# Patient Record
Sex: FEMALE | Race: WHITE | NOT HISPANIC OR LATINO
[De-identification: names, ages, dates, MRNs, and addresses within clinical notes are randomized per-mention and may not be internally consistent; named-entity substitution may affect disease eponyms.]

---

## 2023-05-17 PROBLEM — Z00.00 ENCOUNTER FOR PREVENTIVE HEALTH EXAMINATION: Status: ACTIVE | Noted: 2023-05-17

## 2023-06-06 ENCOUNTER — NON-APPOINTMENT (OUTPATIENT)
Age: 63
End: 2023-06-06

## 2023-06-07 ENCOUNTER — APPOINTMENT (OUTPATIENT)
Dept: CT IMAGING | Facility: IMAGING CENTER | Age: 63
End: 2023-06-07

## 2023-06-07 ENCOUNTER — APPOINTMENT (OUTPATIENT)
Dept: TRANSPLANT | Facility: CLINIC | Age: 63
End: 2023-06-07

## 2023-06-07 ENCOUNTER — APPOINTMENT (OUTPATIENT)
Dept: RADIOLOGY | Facility: IMAGING CENTER | Age: 63
End: 2023-06-07

## 2023-06-07 ENCOUNTER — APPOINTMENT (OUTPATIENT)
Dept: NEPHROLOGY | Facility: CLINIC | Age: 63
End: 2023-06-07
Payer: SUBSIDIZED

## 2023-06-07 VITALS
SYSTOLIC BLOOD PRESSURE: 160 MMHG | WEIGHT: 176 LBS | RESPIRATION RATE: 14 BRPM | BODY MASS INDEX: 33.23 KG/M2 | HEIGHT: 61 IN | OXYGEN SATURATION: 98 % | HEART RATE: 74 BPM | DIASTOLIC BLOOD PRESSURE: 90 MMHG

## 2023-06-07 VITALS — DIASTOLIC BLOOD PRESSURE: 82 MMHG | SYSTOLIC BLOOD PRESSURE: 140 MMHG

## 2023-06-07 VITALS — SYSTOLIC BLOOD PRESSURE: 155 MMHG | DIASTOLIC BLOOD PRESSURE: 90 MMHG

## 2023-06-07 VITALS — SYSTOLIC BLOOD PRESSURE: 143 MMHG | DIASTOLIC BLOOD PRESSURE: 92 MMHG

## 2023-06-07 PROCEDURE — 99205 OFFICE O/P NEW HI 60 MIN: CPT

## 2023-06-07 NOTE — HISTORY OF PRESENT ILLNESS
[FreeTextEntry1] : 62  years old female, born in [facility] , living in  since in . Want to be evaluated for kidney donor for her -Dr. Roni Jackson, who is a  in [place] and a preemptive kidney transplant candidate with diabetic kidney disease.\par \par She reports she was discouraged as donor from [facility] due to asymmetric kidneys\par \par Patient has no known kidney disease/DM . She has  HTN (age 59) on Olmesartan. H/o Hyperlipidemia on statin. No h/o Gout\par No known h/o kidney stone\par No hematuria/Transfusions\par Nocturia: twice.\par No h/o Sleep apnea. Had h/o Thyroid inflammation. Was on Levothyroxine a year ago, but not anymore.\par Does not take any Coumadin/eliquis/Plavix/Brilinta. No known h/o tuberculosis or hepatitis.\par She had hepatitis A.\par No h/o NSAID use.\par No major weight loss/weight loss surgeries\par Has no h/o Pneumonia / UTI.\par No known h/o active CAD/CVA/PVD/DVT/neoplasia/active infections/bleeding/open wounds/falls/seizures/psych issues\par Had mild COVID.\par COVID vaccination:Yes\par \par Most recent hospitalization/for:\par Past surgeries: \par None\par No history of kidney/ bladder surgery.\par Non smoker.\par Family: \par Lives with: ; Son lives in [place]\par Care giver post op:\par Parents are . Father - Kidney stones  Mother- HTN , Myeloma kidney failure was on dialysis Siblings- only child\par Children: One son,33 years old,  he is a physician at [employer] as Rheumatologist- Silas Cooper. Two grand children.\par No family history of kidney disease\par Independent for ADL\par Able to walk two blocks, can climb stairs with difficulty.\par \par ROS: Has no h/o shortness of breath on exertion. 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

## 2023-06-07 NOTE — ASSESSMENT
[FreeTextEntry1] : Potential kidney donor: Clinically borderline as a potential donor. Has hypertensin, obesity, hyperlipidemia on treatment, h/o being excluded as potential donor for asymmetric  renal imaging. Discussed process of evaluation of kidney donor including medical, surgical and psychosocial evaluation\par Discussed methods of assessing risks for CKD, perioperative risk and long term risks following kidney donation.\par Patient already had discussion regarding donation surgery and perioperative risks with surgeon, and again discussed these and answered questions.\par Discussed decline in GFR with age and effect of kidney donation on GFR\par Discussed Renal function Preservation Strategies.\par Discussed available data on follow up of kidney donors in literature and and data on potential risks of kidney donation.\par Discussed role of donor team members in the process of donor evaluation.\par discussed regarding progression of decline in GFR with chronic kidney diseases with one vs two kidneys.\par Discussed and and answered questions on the alternatives available to the potential kidney recipient including long term dialysis,  donor transplantation and potential advantages to the recipients of living donor transplantation.\par She reports  is very reluctant to accept her kidney. However she wishes to proceed with evaluation. Will need imaging studies to be reviewed with team prior to ascertaining candidacy as kidney donor. She is aware of this.

## 2023-06-07 NOTE — PHYSICAL EXAM
[General Appearance - Alert] : alert [General Appearance - In No Acute Distress] : in no acute distress [Sclera] : the sclera and conjunctiva were normal [PERRL With Normal Accommodation] : pupils were equal in size, round, and reactive to light [Extraocular Movements] : extraocular movements were intact [Outer Ear] : the ears and nose were normal in appearance [Oropharynx] : the oropharynx was normal [Jugular Venous Distention Increased] : there was no jugular-venous distention [Auscultation Breath Sounds / Voice Sounds] : lungs were clear to auscultation bilaterally [Heart Sounds Gallop] : no gallops [Heart Sounds Pericardial Friction Rub] : no pericardial rub [Full Pulse] : the pedal pulses are present [Edema] : there was no peripheral edema [Abdomen Soft] : soft [Abdomen Tenderness] : non-tender [Cervical Lymph Nodes Enlarged Posterior Bilaterally] : posterior cervical [Supraclavicular Lymph Nodes Enlarged Bilaterally] : supraclavicular [Cervical Lymph Nodes Enlarged Anterior Bilaterally] : anterior cervical [Involuntary Movements] : no involuntary movements were seen [] : no rash [No Focal Deficits] : no focal deficits [Oriented To Time, Place, And Person] : oriented to person, place, and time [Impaired Insight] : insight and judgment were intact [Affect] : the affect was normal

## 2023-06-23 ENCOUNTER — OUTPATIENT (OUTPATIENT)
Dept: OUTPATIENT SERVICES | Facility: HOSPITAL | Age: 63
LOS: 1 days | End: 2023-06-23

## 2023-06-23 DIAGNOSIS — Z00.8 ENCOUNTER FOR OTHER GENERAL EXAMINATION: ICD-10-CM

## 2023-07-05 ENCOUNTER — APPOINTMENT (OUTPATIENT)
Dept: CARDIOLOGY | Facility: CLINIC | Age: 63
End: 2023-07-05
Payer: SUBSIDIZED

## 2023-07-05 PROCEDURE — 93351 STRESS TTE COMPLETE: CPT

## 2023-07-12 ENCOUNTER — APPOINTMENT (OUTPATIENT)
Dept: CV DIAGNOSITCS | Facility: HOSPITAL | Age: 63
End: 2023-07-12
Payer: SUBSIDIZED

## 2023-07-12 ENCOUNTER — OUTPATIENT (OUTPATIENT)
Dept: OUTPATIENT SERVICES | Facility: HOSPITAL | Age: 63
LOS: 1 days | End: 2023-07-12

## 2023-07-12 DIAGNOSIS — I10 ESSENTIAL (PRIMARY) HYPERTENSION: ICD-10-CM

## 2023-07-12 DIAGNOSIS — Z00.5 ENCOUNTER FOR EXAMINATION OF POTENTIAL DONOR OF ORGAN AND TISSUE: ICD-10-CM

## 2023-07-12 PROCEDURE — 93306 TTE W/DOPPLER COMPLETE: CPT | Mod: 26

## 2023-07-13 ENCOUNTER — NON-APPOINTMENT (OUTPATIENT)
Age: 63
End: 2023-07-13

## 2023-07-17 ENCOUNTER — TRANSCRIPTION ENCOUNTER (OUTPATIENT)
Age: 63
End: 2023-07-17

## 2023-07-28 ENCOUNTER — APPOINTMENT (OUTPATIENT)
Dept: TRANSPLANT | Facility: CLINIC | Age: 63
End: 2023-07-28
Payer: SUBSIDIZED

## 2023-07-28 ENCOUNTER — OUTPATIENT (OUTPATIENT)
Dept: OUTPATIENT SERVICES | Facility: HOSPITAL | Age: 63
LOS: 1 days | End: 2023-07-28
Payer: COMMERCIAL

## 2023-07-28 VITALS
HEART RATE: 67 BPM | DIASTOLIC BLOOD PRESSURE: 94 MMHG | BODY MASS INDEX: 32.66 KG/M2 | SYSTOLIC BLOOD PRESSURE: 154 MMHG | WEIGHT: 173 LBS | HEIGHT: 61 IN

## 2023-07-28 VITALS
TEMPERATURE: 98 F | WEIGHT: 172.62 LBS | OXYGEN SATURATION: 97 % | HEART RATE: 65 BPM | SYSTOLIC BLOOD PRESSURE: 136 MMHG | HEIGHT: 62 IN | DIASTOLIC BLOOD PRESSURE: 86 MMHG | RESPIRATION RATE: 16 BRPM

## 2023-07-28 VITALS
HEIGHT: 61 IN | RESPIRATION RATE: 14 BRPM | DIASTOLIC BLOOD PRESSURE: 107 MMHG | TEMPERATURE: 98 F | SYSTOLIC BLOOD PRESSURE: 179 MMHG | HEART RATE: 72 BPM | BODY MASS INDEX: 33.23 KG/M2 | OXYGEN SATURATION: 98 % | WEIGHT: 176 LBS

## 2023-07-28 VITALS — DIASTOLIC BLOOD PRESSURE: 90 MMHG | SYSTOLIC BLOOD PRESSURE: 128 MMHG

## 2023-07-28 DIAGNOSIS — Z29.9 ENCOUNTER FOR PROPHYLACTIC MEASURES, UNSPECIFIED: ICD-10-CM

## 2023-07-28 DIAGNOSIS — Z98.890 OTHER SPECIFIED POSTPROCEDURAL STATES: Chronic | ICD-10-CM

## 2023-07-28 DIAGNOSIS — Z01.818 ENCOUNTER FOR OTHER PREPROCEDURAL EXAMINATION: ICD-10-CM

## 2023-07-28 DIAGNOSIS — Z52.4 KIDNEY DONOR: ICD-10-CM

## 2023-07-28 DIAGNOSIS — Z90.89 ACQUIRED ABSENCE OF OTHER ORGANS: Chronic | ICD-10-CM

## 2023-07-28 LAB
BLD GP AB SCN SERPL QL: NEGATIVE — SIGNIFICANT CHANGE UP
RH IG SCN BLD-IMP: POSITIVE — SIGNIFICANT CHANGE UP

## 2023-07-28 PROCEDURE — 99205 OFFICE O/P NEW HI 60 MIN: CPT

## 2023-07-28 PROCEDURE — 86900 BLOOD TYPING SEROLOGIC ABO: CPT

## 2023-07-28 PROCEDURE — G0463: CPT

## 2023-07-28 PROCEDURE — 86850 RBC ANTIBODY SCREEN: CPT

## 2023-07-28 PROCEDURE — 86901 BLOOD TYPING SEROLOGIC RH(D): CPT

## 2023-07-28 RX ORDER — CHLORHEXIDINE GLUCONATE 213 G/1000ML
1 SOLUTION TOPICAL ONCE
Refills: 0 | Status: DISCONTINUED | OUTPATIENT
Start: 2023-08-07 | End: 2023-08-09

## 2023-07-28 NOTE — H&P PST ADULT - OPHTHALMOLOGIC
Spoke with mom. Hotelementst access obtained. Video visit with provider  Completed.    Electronically Signed by:    Delilah Mcfarlane CMA  07/08/20         details…

## 2023-07-28 NOTE — H&P PST ADULT - ATTENDING COMMENTS
VERONICA MCKENNA was seen and evaluated today in the preoperative holding area.  As documented, VERONICA MCKENNA is a 63yFemale here as a kidney donor. She has had no major illnesses or hospitalizations since the last office visit.    We discussed the risks and benefits of laparoscopic, possible open, donor nephrectomy.  Surgical risks include but are not limited to bleeding, infection, urine leak, rhabdomyolysis, need to convert to an open procedure and potential need for re operation postoperatively.  We also discussed the small risk of dying as an immediate consequency of the operation.  VERONICA MCKENNA understands these risks and is willing to proceed with donor nephrectomy.

## 2023-07-28 NOTE — PHYSICAL EXAM
[Well Developed] : well developed [Clear to Auscultation] : clear to auscultation [Breathing Comfortably on RA] : breathing comfortably on room air [Normal Rate] : normal rate [Regular Rhythm] : regular rhythm [Soft] : soft [Non-tender] : non-tender [Alert] : alert [Oriented] : oriented [Appropriate] : appropriate

## 2023-07-28 NOTE — H&P PST ADULT - NSICDXPASTSURGICALHX_GEN_ALL_CORE_FT
PAST SURGICAL HISTORY:  H/O colonoscopy      PAST SURGICAL HISTORY:  H/O colonoscopy      (normal spontaneous vaginal delivery)     S/P tonsillectomy

## 2023-07-28 NOTE — H&P PST ADULT - NSICDXFAMILYHX_GEN_ALL_CORE_FT
FAMILY HISTORY:  Father  Still living? Unknown  FH: hypertension, Age at diagnosis: Age Unknown    Mother  Still living? No  FH: hypertension, Age at diagnosis: Age Unknown  FH: kidney failure, Age at diagnosis: Age Unknown  FH: multiple myeloma, Age at diagnosis: Age Unknown

## 2023-07-28 NOTE — REVIEW OF SYSTEMS
[Fever] : no fever [Night Sweats] : no night sweats [Chest Pain] : no chest pain [Orthopnea] : no orthopnea [Shortness Of Breath] : no shortness of breath [Abdominal Pain] : no abdominal pain [Nausea] : no nausea [Vomiting] : no vomiting [Heartburn] : no heartburn [Incontinence] : no incontinence [Joint Pain] : no joint pain [Muscle Pain] : no muscle pain [Headache] : no headache [Memory Loss] : no memory loss

## 2023-07-28 NOTE — H&P PST ADULT - NSICDXPASTMEDICALHX_GEN_ALL_CORE_FT
PAST MEDICAL HISTORY:  2019 novel coronavirus disease (COVID-19)     Hyperlipidemia     Hypertension      PAST MEDICAL HISTORY:  2019 novel coronavirus disease (COVID-19)     History of IBS     Hyperlipidemia     Hypertension

## 2023-07-28 NOTE — H&P PST ADULT - HISTORY OF PRESENT ILLNESS
64 y/o female presents today for kidney donation.   64 y/o female presents today for presurgical testing as she is donating her kidney to her .  PMHx includes HTN (controlled with monotherapy), HLD and IBS.  She is scheduled for laparoscopic right nephrectomy for living donation, possible open, possible left on 8/7/23.      She denies any recent infections, fever, chills, chest pain, shortness of breath, cough, wheezing, sore throat and change in taste/smell.

## 2023-07-28 NOTE — H&P PST ADULT - ASSESSMENT
DASI score: 6.70  DASI activity: walks, 2 flights, housework, ADL's  Loose teeth or denture: denies  Mallampati II    CAPRINI VTE 2.0 SCORE [CLOT updated 2019]    AGE RELATED RISK FACTORS                                                       MOBILITY RELATED FACTORS  [ ] Age 41-60 years                                            (1 Point)                    [ ] Bed rest                                                        (1 Point)  [x ] Age: 61-74 years                                           (2 Points)                  [ ] Plaster cast                                                   (2 Points)  [ ] Age= 75 years                                              (3 Points)                    [ ] Bed bound for more than 72 hours                 (2 Points)    DISEASE RELATED RISK FACTORS                                               GENDER SPECIFIC FACTORS  [ ] Edema in the lower extremities                       (1 Point)              [ ] Pregnancy                                                     (1 Point)  [ ] Varicose veins                                               (1 Point)                     [ ] Post-partum < 6 weeks                                   (1 Point)             [ ] BMI > 25 Kg/m2                                            (1 Point)                     [ ] Hormonal therapy  or oral contraception          (1 Point)                 [ ] Sepsis (in the previous month)                        (1 Point)               [ ] History of pregnancy complications                 (1 point)  [ ] Pneumonia or serious lung disease                                               [ ] Unexplained or recurrent                     (1 Point)           (in the previous month)                               (1 Point)  [ ] Abnormal pulmonary function test                     (1 Point)                 SURGERY RELATED RISK FACTORS  [ ] Acute myocardial infarction                              (1 Point)               [ ]  Section                                             (1 Point)  [ ] Congestive heart failure (in the previous month)  (1 Point)      [ ] Minor surgery                                                  (1 Point)   [ ] Inflammatory bowel disease                             (1 Point)               [ ] Arthroscopic surgery                                        (2 Points)  [ ] Central venous access                                      (2 Points)                [x ] General surgery lasting more than 45 minutes (2 points)  [ ] Malignancy- Present or previous                   (2 Points)                [ ] Elective arthroplasty                                         (5 points)    [ ] Stroke (in the previous month)                          (5 Points)                                                                                                                                                           HEMATOLOGY RELATED FACTORS                                                 TRAUMA RELATED RISK FACTORS  [ ] Prior episodes of VTE                                     (3 Points)                [ ] Fracture of the hip, pelvis, or leg                       (5 Points)  [ ] Positive family history for VTE                         (3 Points)             [ ] Acute spinal cord injury (in the previous month)  (5 Points)  [ ] Prothrombin 96664 A                                     (3 Points)               [ ] Paralysis  (less than 1 month)                             (5 Points)  [ ] Factor V Leiden                                             (3 Points)                  [ ] Multiple Trauma within 1 month                        (5 Points)  [ ] Lupus anticoagulants                                     (3 Points)                                                           [ ] Anticardiolipin antibodies                               (3 Points)                                                       [ ] High homocysteine in the blood                      (3 Points)                                             [ ] Other congenital or acquired thrombophilia      (3 Points)                                                [ ] Heparin induced thrombocytopenia                  (3 Points)                                     Total Score [     4     ]

## 2023-07-28 NOTE — H&P PST ADULT - PROBLEM SELECTOR PLAN 1
Pt. scheduled for laparoscopic right nephrectomy for living kidney donation, possible open, possible left on 8/7/23.  Preop instructions reviewed, pt verbalized understanding.  Preop labs drawn today:  T & S.  (CBC, CMP obtained by transplant team on 7/27/23 & Urine C/S obtained 7/28/23).

## 2023-07-28 NOTE — HISTORY OF PRESENT ILLNESS
[FreeTextEntry5] : 64yo F presents for pretxp donor clearance.  Would like to donate to her . \par \par She follows w PCP for routine care, on HTN monotherapy.  Reports white coat HTN and anxiety as well. Had home BPs followed x1wk ~100-110/70-80.  No h/o DM, hyperglycemia. No h/o gestational DM/HTN.  Remote h/o UTI treated w abx, no hematuria/kidney stones. \par \par Can tolerate 2 flights of stairs without dyspnea or angina.  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 lives with  Roni. Son and daughter-in-law will help post transplant\par FamHx: no DM, CKD in mom in setting of multiple myeloma.\par

## 2023-08-01 LAB
ABO + RH PNL BLD: NORMAL
ABO + RH PNL BLD: NORMAL
ALBUMIN SERPL ELPH-MCNC: 4.7 G/DL
ALBUMIN SERPL ELPH-MCNC: 4.7 G/DL
ALP BLD-CCNC: 67 U/L
ALP BLD-CCNC: 70 U/L
ALT SERPL-CCNC: 13 U/L
ALT SERPL-CCNC: 15 U/L
ANION GAP SERPL CALC-SCNC: 12 MMOL/L
ANION GAP SERPL CALC-SCNC: 14 MMOL/L
APPEARANCE: CLEAR
APPEARANCE: CLEAR
APTT BLD: 31.3 SEC
AST SERPL-CCNC: 14 U/L
AST SERPL-CCNC: 15 U/L
BACTERIA UR CULT: NORMAL
BACTERIA: NEGATIVE /HPF
BACTERIA: NEGATIVE /HPF
BILIRUB SERPL-MCNC: 0.6 MG/DL
BILIRUB SERPL-MCNC: 0.6 MG/DL
BILIRUBIN URINE: NEGATIVE
BILIRUBIN URINE: NEGATIVE
BLOOD URINE: NEGATIVE
BLOOD URINE: NEGATIVE
BUN SERPL-MCNC: 12 MG/DL
BUN SERPL-MCNC: 15 MG/DL
CALCIUM SERPL-MCNC: 10.1 MG/DL
CALCIUM SERPL-MCNC: 9.6 MG/DL
CAST: 0 /LPF
CAST: 0 /LPF
CHLORIDE SERPL-SCNC: 107 MMOL/L
CHLORIDE SERPL-SCNC: 108 MMOL/L
CO2 SERPL-SCNC: 24 MMOL/L
CO2 SERPL-SCNC: 24 MMOL/L
COLOR: YELLOW
COLOR: YELLOW
CREAT SERPL-MCNC: 0.69 MG/DL
CREAT SERPL-MCNC: 0.72 MG/DL
CYSTATIN C SERPL-MCNC: 0.77 MG/L
EGFR: 93 ML/MIN/1.73M2
EGFR: 96 ML/MIN/1.73M2
EPITHELIAL CELLS: 0 /HPF
EPITHELIAL CELLS: 0 /HPF
GFR/BSA.PRED SERPLBLD CYS-BASED-ARV: 98 ML/MIN/1.73M2
GLUCOSE QUALITATIVE U: NEGATIVE MG/DL
GLUCOSE QUALITATIVE U: NEGATIVE MG/DL
GLUCOSE SERPL-MCNC: 101 MG/DL
GLUCOSE SERPL-MCNC: 87 MG/DL
HBV CORE IGG+IGM SER QL: NONREACTIVE
HBV SURFACE AB SER QL: NONREACTIVE
HBV SURFACE AG SER QL: NONREACTIVE
HCV AB SER QL: NONREACTIVE
HCV S/CO RATIO: 0.06 S/CO
HIV1+2 AB SPEC QL IA.RAPID: NONREACTIVE
INR PPP: 0.92 RATIO
KETONES URINE: NEGATIVE MG/DL
KETONES URINE: NEGATIVE MG/DL
LEUKOCYTE ESTERASE URINE: NEGATIVE
LEUKOCYTE ESTERASE URINE: NEGATIVE
MICROSCOPIC-UA: NORMAL
MICROSCOPIC-UA: NORMAL
NITRITE URINE: NEGATIVE
NITRITE URINE: NEGATIVE
PH URINE: 7
PH URINE: 7.5
POTASSIUM SERPL-SCNC: 4.4 MMOL/L
POTASSIUM SERPL-SCNC: 5.4 MMOL/L
PROT SERPL-MCNC: 7.1 G/DL
PROT SERPL-MCNC: 7.3 G/DL
PROTEIN URINE: NEGATIVE MG/DL
PROTEIN URINE: NEGATIVE MG/DL
PT BLD: 10.4 SEC
RED BLOOD CELLS URINE: 0 /HPF
RED BLOOD CELLS URINE: 3 /HPF
SODIUM SERPL-SCNC: 143 MMOL/L
SODIUM SERPL-SCNC: 144 MMOL/L
SPECIFIC GRAVITY URINE: 1.01
SPECIFIC GRAVITY URINE: 1.02
UROBILINOGEN URINE: 0.2 MG/DL
UROBILINOGEN URINE: 0.2 MG/DL
WHITE BLOOD CELLS URINE: 0 /HPF
WHITE BLOOD CELLS URINE: 0 /HPF

## 2023-08-02 PROBLEM — Z87.19 PERSONAL HISTORY OF OTHER DISEASES OF THE DIGESTIVE SYSTEM: Chronic | Status: ACTIVE | Noted: 2023-07-28

## 2023-08-02 PROBLEM — I10 ESSENTIAL (PRIMARY) HYPERTENSION: Chronic | Status: ACTIVE | Noted: 2023-07-28

## 2023-08-02 PROBLEM — E78.5 HYPERLIPIDEMIA, UNSPECIFIED: Chronic | Status: ACTIVE | Noted: 2023-07-28

## 2023-08-02 PROBLEM — U07.1 COVID-19: Chronic | Status: ACTIVE | Noted: 2023-07-28

## 2023-08-06 ENCOUNTER — TRANSCRIPTION ENCOUNTER (OUTPATIENT)
Age: 63
End: 2023-08-06

## 2023-08-07 ENCOUNTER — INPATIENT (INPATIENT)
Facility: HOSPITAL | Age: 63
LOS: 1 days | Discharge: HOME | DRG: 661 | End: 2023-08-09
Attending: TRANSPLANT SURGERY | Admitting: TRANSPLANT SURGERY
Payer: COMMERCIAL

## 2023-08-07 ENCOUNTER — APPOINTMENT (OUTPATIENT)
Dept: TRANSPLANT | Facility: HOSPITAL | Age: 63
End: 2023-08-07

## 2023-08-07 VITALS
SYSTOLIC BLOOD PRESSURE: 171 MMHG | OXYGEN SATURATION: 98 % | HEIGHT: 62 IN | RESPIRATION RATE: 18 BRPM | HEART RATE: 79 BPM | TEMPERATURE: 98 F | DIASTOLIC BLOOD PRESSURE: 93 MMHG | WEIGHT: 172.62 LBS

## 2023-08-07 DIAGNOSIS — Z52.4 KIDNEY DONOR: ICD-10-CM

## 2023-08-07 DIAGNOSIS — Z90.89 ACQUIRED ABSENCE OF OTHER ORGANS: Chronic | ICD-10-CM

## 2023-08-07 DIAGNOSIS — Z98.890 OTHER SPECIFIED POSTPROCEDURAL STATES: Chronic | ICD-10-CM

## 2023-08-07 LAB
ANION GAP SERPL CALC-SCNC: 17 MMOL/L — SIGNIFICANT CHANGE UP (ref 5–17)
ANION GAP SERPL CALC-SCNC: 18 MMOL/L — HIGH (ref 5–17)
BASOPHILS # BLD AUTO: 0.02 K/UL — SIGNIFICANT CHANGE UP (ref 0–0.2)
BASOPHILS # BLD AUTO: 0.04 K/UL — SIGNIFICANT CHANGE UP (ref 0–0.2)
BASOPHILS NFR BLD AUTO: 0.2 % — SIGNIFICANT CHANGE UP (ref 0–2)
BASOPHILS NFR BLD AUTO: 0.3 % — SIGNIFICANT CHANGE UP (ref 0–2)
BUN SERPL-MCNC: 10 MG/DL — SIGNIFICANT CHANGE UP (ref 7–23)
BUN SERPL-MCNC: 11 MG/DL — SIGNIFICANT CHANGE UP (ref 7–23)
CALCIUM SERPL-MCNC: 8.2 MG/DL — LOW (ref 8.4–10.5)
CALCIUM SERPL-MCNC: 8.3 MG/DL — LOW (ref 8.4–10.5)
CHLORIDE SERPL-SCNC: 100 MMOL/L — SIGNIFICANT CHANGE UP (ref 96–108)
CHLORIDE SERPL-SCNC: 100 MMOL/L — SIGNIFICANT CHANGE UP (ref 96–108)
CO2 SERPL-SCNC: 22 MMOL/L — SIGNIFICANT CHANGE UP (ref 22–31)
CO2 SERPL-SCNC: 24 MMOL/L — SIGNIFICANT CHANGE UP (ref 22–31)
CREAT SERPL-MCNC: 0.76 MG/DL — SIGNIFICANT CHANGE UP (ref 0.5–1.3)
CREAT SERPL-MCNC: 0.98 MG/DL — SIGNIFICANT CHANGE UP (ref 0.5–1.3)
EGFR: 64 ML/MIN/1.73M2 — SIGNIFICANT CHANGE UP
EGFR: 87 ML/MIN/1.73M2 — SIGNIFICANT CHANGE UP
EOSINOPHIL # BLD AUTO: 0 K/UL — SIGNIFICANT CHANGE UP (ref 0–0.5)
EOSINOPHIL # BLD AUTO: 0.01 K/UL — SIGNIFICANT CHANGE UP (ref 0–0.5)
EOSINOPHIL NFR BLD AUTO: 0 % — SIGNIFICANT CHANGE UP (ref 0–6)
EOSINOPHIL NFR BLD AUTO: 0.1 % — SIGNIFICANT CHANGE UP (ref 0–6)
GLUCOSE SERPL-MCNC: 198 MG/DL — HIGH (ref 70–99)
GLUCOSE SERPL-MCNC: 206 MG/DL — HIGH (ref 70–99)
HCT VFR BLD CALC: 40.5 % — SIGNIFICANT CHANGE UP (ref 34.5–45)
HCT VFR BLD CALC: 41.1 % — SIGNIFICANT CHANGE UP (ref 34.5–45)
HGB BLD-MCNC: 13.5 G/DL — SIGNIFICANT CHANGE UP (ref 11.5–15.5)
HGB BLD-MCNC: 13.7 G/DL — SIGNIFICANT CHANGE UP (ref 11.5–15.5)
IMM GRANULOCYTES NFR BLD AUTO: 0.5 % — SIGNIFICANT CHANGE UP (ref 0–0.9)
IMM GRANULOCYTES NFR BLD AUTO: 0.7 % — SIGNIFICANT CHANGE UP (ref 0–0.9)
LYMPHOCYTES # BLD AUTO: 0.56 K/UL — LOW (ref 1–3.3)
LYMPHOCYTES # BLD AUTO: 1.03 K/UL — SIGNIFICANT CHANGE UP (ref 1–3.3)
LYMPHOCYTES # BLD AUTO: 4.3 % — LOW (ref 13–44)
LYMPHOCYTES # BLD AUTO: 7.2 % — LOW (ref 13–44)
MAGNESIUM SERPL-MCNC: 1.9 MG/DL — SIGNIFICANT CHANGE UP (ref 1.6–2.6)
MAGNESIUM SERPL-MCNC: 2.2 MG/DL — SIGNIFICANT CHANGE UP (ref 1.6–2.6)
MCHC RBC-ENTMCNC: 29.4 PG — SIGNIFICANT CHANGE UP (ref 27–34)
MCHC RBC-ENTMCNC: 29.6 PG — SIGNIFICANT CHANGE UP (ref 27–34)
MCHC RBC-ENTMCNC: 33.3 GM/DL — SIGNIFICANT CHANGE UP (ref 32–36)
MCHC RBC-ENTMCNC: 33.3 GM/DL — SIGNIFICANT CHANGE UP (ref 32–36)
MCV RBC AUTO: 88.2 FL — SIGNIFICANT CHANGE UP (ref 80–100)
MCV RBC AUTO: 88.8 FL — SIGNIFICANT CHANGE UP (ref 80–100)
MONOCYTES # BLD AUTO: 0.21 K/UL — SIGNIFICANT CHANGE UP (ref 0–0.9)
MONOCYTES # BLD AUTO: 0.29 K/UL — SIGNIFICANT CHANGE UP (ref 0–0.9)
MONOCYTES NFR BLD AUTO: 1.5 % — LOW (ref 2–14)
MONOCYTES NFR BLD AUTO: 2.2 % — SIGNIFICANT CHANGE UP (ref 2–14)
NEUTROPHILS # BLD AUTO: 11.99 K/UL — HIGH (ref 1.8–7.4)
NEUTROPHILS # BLD AUTO: 12.84 K/UL — HIGH (ref 1.8–7.4)
NEUTROPHILS NFR BLD AUTO: 90.2 % — HIGH (ref 43–77)
NEUTROPHILS NFR BLD AUTO: 92.8 % — HIGH (ref 43–77)
NRBC # BLD: 0 /100 WBCS — SIGNIFICANT CHANGE UP (ref 0–0)
NRBC # BLD: 0 /100 WBCS — SIGNIFICANT CHANGE UP (ref 0–0)
PHOSPHATE SERPL-MCNC: 3.6 MG/DL — SIGNIFICANT CHANGE UP (ref 2.5–4.5)
PHOSPHATE SERPL-MCNC: 5 MG/DL — HIGH (ref 2.5–4.5)
PLATELET # BLD AUTO: 179 K/UL — SIGNIFICANT CHANGE UP (ref 150–400)
PLATELET # BLD AUTO: 182 K/UL — SIGNIFICANT CHANGE UP (ref 150–400)
POTASSIUM SERPL-MCNC: 3.7 MMOL/L — SIGNIFICANT CHANGE UP (ref 3.5–5.3)
POTASSIUM SERPL-MCNC: 3.7 MMOL/L — SIGNIFICANT CHANGE UP (ref 3.5–5.3)
POTASSIUM SERPL-SCNC: 3.7 MMOL/L — SIGNIFICANT CHANGE UP (ref 3.5–5.3)
POTASSIUM SERPL-SCNC: 3.7 MMOL/L — SIGNIFICANT CHANGE UP (ref 3.5–5.3)
RBC # BLD: 4.59 M/UL — SIGNIFICANT CHANGE UP (ref 3.8–5.2)
RBC # BLD: 4.63 M/UL — SIGNIFICANT CHANGE UP (ref 3.8–5.2)
RBC # FLD: 12.6 % — SIGNIFICANT CHANGE UP (ref 10.3–14.5)
RBC # FLD: 12.6 % — SIGNIFICANT CHANGE UP (ref 10.3–14.5)
SODIUM SERPL-SCNC: 140 MMOL/L — SIGNIFICANT CHANGE UP (ref 135–145)
SODIUM SERPL-SCNC: 141 MMOL/L — SIGNIFICANT CHANGE UP (ref 135–145)
WBC # BLD: 12.92 K/UL — HIGH (ref 3.8–10.5)
WBC # BLD: 14.23 K/UL — HIGH (ref 3.8–10.5)
WBC # FLD AUTO: 12.92 K/UL — HIGH (ref 3.8–10.5)
WBC # FLD AUTO: 14.23 K/UL — HIGH (ref 3.8–10.5)

## 2023-08-07 PROCEDURE — 50547 LAPARO REMOVAL DONOR KIDNEY: CPT | Mod: GC

## 2023-08-07 PROCEDURE — 99222 1ST HOSP IP/OBS MODERATE 55: CPT

## 2023-08-07 DEVICE — STAPLER ETHICON GST ECHELON 45MM WHITE RELOAD: Type: IMPLANTABLE DEVICE | Status: FUNCTIONAL

## 2023-08-07 DEVICE — SURGICEL POWDER 3 GRAMS: Type: IMPLANTABLE DEVICE | Status: FUNCTIONAL

## 2023-08-07 DEVICE — CLIP APPLIER COVIDIEN ENDOCLIP II 10MM MED/LG: Type: IMPLANTABLE DEVICE | Status: FUNCTIONAL

## 2023-08-07 DEVICE — SURGICEL 2 X 14": Type: IMPLANTABLE DEVICE | Status: FUNCTIONAL

## 2023-08-07 RX ORDER — OXYCODONE HYDROCHLORIDE 5 MG/1
5 TABLET ORAL ONCE
Refills: 0 | Status: DISCONTINUED | OUTPATIENT
Start: 2023-08-07 | End: 2023-08-07

## 2023-08-07 RX ORDER — FAMOTIDINE 10 MG/ML
20 INJECTION INTRAVENOUS ONCE
Refills: 0 | Status: COMPLETED | OUTPATIENT
Start: 2023-08-07 | End: 2023-08-07

## 2023-08-07 RX ORDER — HEPARIN SODIUM 5000 [USP'U]/ML
5000 INJECTION INTRAVENOUS; SUBCUTANEOUS EVERY 12 HOURS
Refills: 0 | Status: DISCONTINUED | OUTPATIENT
Start: 2023-08-07 | End: 2023-08-09

## 2023-08-07 RX ORDER — HYDROMORPHONE HYDROCHLORIDE 2 MG/ML
0.5 INJECTION INTRAMUSCULAR; INTRAVENOUS; SUBCUTANEOUS
Refills: 0 | Status: DISCONTINUED | OUTPATIENT
Start: 2023-08-07 | End: 2023-08-07

## 2023-08-07 RX ORDER — ONDANSETRON 8 MG/1
4 TABLET, FILM COATED ORAL EVERY 4 HOURS
Refills: 0 | Status: DISCONTINUED | OUTPATIENT
Start: 2023-08-07 | End: 2023-08-09

## 2023-08-07 RX ORDER — OLMESARTAN MEDOXOMIL 5 MG/1
1 TABLET, FILM COATED ORAL
Refills: 0 | DISCHARGE

## 2023-08-07 RX ORDER — NALBUPHINE HYDROCHLORIDE 10 MG/ML
2.5 INJECTION, SOLUTION INTRAMUSCULAR; INTRAVENOUS; SUBCUTANEOUS EVERY 6 HOURS
Refills: 0 | Status: DISCONTINUED | OUTPATIENT
Start: 2023-08-07 | End: 2023-08-08

## 2023-08-07 RX ORDER — SODIUM CHLORIDE 9 MG/ML
1000 INJECTION, SOLUTION INTRAVENOUS
Refills: 0 | Status: DISCONTINUED | OUTPATIENT
Start: 2023-08-07 | End: 2023-08-09

## 2023-08-07 RX ORDER — TRAMADOL HYDROCHLORIDE 50 MG/1
25 TABLET ORAL EVERY 6 HOURS
Refills: 0 | Status: DISCONTINUED | OUTPATIENT
Start: 2023-08-07 | End: 2023-08-09

## 2023-08-07 RX ORDER — ONDANSETRON 8 MG/1
4 TABLET, FILM COATED ORAL ONCE
Refills: 0 | Status: COMPLETED | OUTPATIENT
Start: 2023-08-07 | End: 2023-08-07

## 2023-08-07 RX ORDER — ONDANSETRON 8 MG/1
4 TABLET, FILM COATED ORAL EVERY 6 HOURS
Refills: 0 | Status: DISCONTINUED | OUTPATIENT
Start: 2023-08-07 | End: 2023-08-07

## 2023-08-07 RX ORDER — TRAMADOL HYDROCHLORIDE 50 MG/1
50 TABLET ORAL EVERY 6 HOURS
Refills: 0 | Status: DISCONTINUED | OUTPATIENT
Start: 2023-08-07 | End: 2023-08-09

## 2023-08-07 RX ORDER — POLYETHYLENE GLYCOL 3350 17 G/17G
17 POWDER, FOR SOLUTION ORAL DAILY
Refills: 0 | Status: DISCONTINUED | OUTPATIENT
Start: 2023-08-07 | End: 2023-08-09

## 2023-08-07 RX ORDER — SODIUM CHLORIDE 9 MG/ML
3 INJECTION INTRAMUSCULAR; INTRAVENOUS; SUBCUTANEOUS EVERY 8 HOURS
Refills: 0 | Status: DISCONTINUED | OUTPATIENT
Start: 2023-08-07 | End: 2023-08-07

## 2023-08-07 RX ORDER — SENNA PLUS 8.6 MG/1
2 TABLET ORAL AT BEDTIME
Refills: 0 | Status: DISCONTINUED | OUTPATIENT
Start: 2023-08-07 | End: 2023-08-09

## 2023-08-07 RX ORDER — ATORVASTATIN CALCIUM 80 MG/1
10 TABLET, FILM COATED ORAL AT BEDTIME
Refills: 0 | Status: DISCONTINUED | OUTPATIENT
Start: 2023-08-07 | End: 2023-08-09

## 2023-08-07 RX ORDER — LIDOCAINE HCL 20 MG/ML
0.2 VIAL (ML) INJECTION ONCE
Refills: 0 | Status: DISCONTINUED | OUTPATIENT
Start: 2023-08-07 | End: 2023-08-07

## 2023-08-07 RX ORDER — HYDROMORPHONE HYDROCHLORIDE 2 MG/ML
30 INJECTION INTRAMUSCULAR; INTRAVENOUS; SUBCUTANEOUS
Refills: 0 | Status: DISCONTINUED | OUTPATIENT
Start: 2023-08-07 | End: 2023-08-08

## 2023-08-07 RX ORDER — NALOXONE HYDROCHLORIDE 4 MG/.1ML
0.1 SPRAY NASAL
Refills: 0 | Status: DISCONTINUED | OUTPATIENT
Start: 2023-08-07 | End: 2023-08-08

## 2023-08-07 RX ORDER — HYDROMORPHONE HYDROCHLORIDE 2 MG/ML
0.5 INJECTION INTRAMUSCULAR; INTRAVENOUS; SUBCUTANEOUS
Refills: 0 | Status: DISCONTINUED | OUTPATIENT
Start: 2023-08-07 | End: 2023-08-08

## 2023-08-07 RX ORDER — DIPHENHYDRAMINE HCL 50 MG
12.5 CAPSULE ORAL ONCE
Refills: 0 | Status: COMPLETED | OUTPATIENT
Start: 2023-08-07 | End: 2023-08-07

## 2023-08-07 RX ORDER — CEFAZOLIN SODIUM 1 G
2000 VIAL (EA) INJECTION ONCE
Refills: 0 | Status: COMPLETED | OUTPATIENT
Start: 2023-08-07 | End: 2023-08-07

## 2023-08-07 RX ORDER — INSULIN LISPRO 100/ML
VIAL (ML) SUBCUTANEOUS
Refills: 0 | Status: DISCONTINUED | OUTPATIENT
Start: 2023-08-07 | End: 2023-08-08

## 2023-08-07 RX ORDER — ACETAMINOPHEN 500 MG
1000 TABLET ORAL ONCE
Refills: 0 | Status: COMPLETED | OUTPATIENT
Start: 2023-08-07 | End: 2023-08-07

## 2023-08-07 RX ADMIN — Medication 12.5 MILLIGRAM(S): at 17:49

## 2023-08-07 RX ADMIN — ONDANSETRON 4 MILLIGRAM(S): 8 TABLET, FILM COATED ORAL at 15:01

## 2023-08-07 RX ADMIN — HYDROMORPHONE HYDROCHLORIDE 30 MILLILITER(S): 2 INJECTION INTRAMUSCULAR; INTRAVENOUS; SUBCUTANEOUS at 15:45

## 2023-08-07 RX ADMIN — SODIUM CHLORIDE 125 MILLILITER(S): 9 INJECTION, SOLUTION INTRAVENOUS at 12:30

## 2023-08-07 RX ADMIN — HEPARIN SODIUM 5000 UNIT(S): 5000 INJECTION INTRAVENOUS; SUBCUTANEOUS at 21:09

## 2023-08-07 RX ADMIN — HYDROMORPHONE HYDROCHLORIDE 0.5 MILLIGRAM(S): 2 INJECTION INTRAMUSCULAR; INTRAVENOUS; SUBCUTANEOUS at 14:46

## 2023-08-07 RX ADMIN — HYDROMORPHONE HYDROCHLORIDE 0.5 MILLIGRAM(S): 2 INJECTION INTRAMUSCULAR; INTRAVENOUS; SUBCUTANEOUS at 12:45

## 2023-08-07 RX ADMIN — FAMOTIDINE 20 MILLIGRAM(S): 10 INJECTION INTRAVENOUS at 17:43

## 2023-08-07 RX ADMIN — HYDROMORPHONE HYDROCHLORIDE 0.5 MILLIGRAM(S): 2 INJECTION INTRAMUSCULAR; INTRAVENOUS; SUBCUTANEOUS at 14:48

## 2023-08-07 RX ADMIN — POLYETHYLENE GLYCOL 3350 17 GRAM(S): 17 POWDER, FOR SOLUTION ORAL at 21:10

## 2023-08-07 RX ADMIN — SENNA PLUS 2 TABLET(S): 8.6 TABLET ORAL at 23:01

## 2023-08-07 RX ADMIN — HYDROMORPHONE HYDROCHLORIDE 30 MILLILITER(S): 2 INJECTION INTRAMUSCULAR; INTRAVENOUS; SUBCUTANEOUS at 21:00

## 2023-08-07 RX ADMIN — Medication 1000 MILLIGRAM(S): at 18:00

## 2023-08-07 RX ADMIN — HYDROMORPHONE HYDROCHLORIDE 0.5 MILLIGRAM(S): 2 INJECTION INTRAMUSCULAR; INTRAVENOUS; SUBCUTANEOUS at 13:00

## 2023-08-07 RX ADMIN — Medication 400 MILLIGRAM(S): at 17:45

## 2023-08-07 RX ADMIN — ATORVASTATIN CALCIUM 10 MILLIGRAM(S): 80 TABLET, FILM COATED ORAL at 21:10

## 2023-08-07 NOTE — CONSULT NOTE ADULT - TIME BILLING
Kidney donor - post donation  S/p Laparoscopic donor nephrectomy  Post op day 0  Reviewed pre donation work up, op notes and post op course  Plan:  Monitor vital signs, I/O, CBC, blood chemistry  Will follow  I was present during and reviewed clinical and lab data as well as assessment and plan as documented by the house staff as noted. Please contact if any additional questions with any change in clinical condition or on availability of any additional information or reports.

## 2023-08-07 NOTE — CONSULT NOTE ADULT - ASSESSMENT
63 y.o F w/ PMHx of HTN, HLD and IBS here for directed kidney donation to her . Now s/p kidney donation.

## 2023-08-07 NOTE — CONSULT NOTE ADULT - PROBLEM SELECTOR RECOMMENDATION 9
Pt is s/p laparoscopic R living donor nephrectomy. Patient is nonoliguric, has made ~1L of UOP already. Labs reviewed, stable. Vitals reviewed.     Resume home BP meds if SBP >150. Pain control with PCU pump.  Avoid nephrotoxic agents. Dose meds per eGFR.    If you have any questions, please feel free to contact me  Robert Guzman  Nephrology Fellow  [phone]; Prefer [employer] TEAMS  (After 5pm or on weekends please page the on-call fellow).

## 2023-08-07 NOTE — PROGRESS NOTE ADULT - ASSESSMENT
64 y/o female PMHx HTN (controlled with monotherapy), HLD and IBS. Underwent laparoscopic right nephrectomy for living donation to her  on 8/7/23    [] s/p R lap donor nephrectomy  - Monitor renal function  - Strict I&O's   - IVF: LR @ 125cc/hr   - Diet: NPO    - Pain control: dCPA  - Summers remove in am after rounds  - IS/ SCDs/OOB  - Daily labs  - resume statin  - HTN: on olmesartan 40 mg oral tablet at home, BP controlled will hold for now

## 2023-08-07 NOTE — CONSULT NOTE ADULT - PROVIDER SPECIALTY LIST ADULT
Pain Management    Progress Note      11/25/2020 3:50 PM     · SUBJECTIVE:    · Chief Complaint: Cancer associated pain, abdominal and back pain   · Patient was seen today in his room and today he was resting very comfortably in his bed and was asleep and needed to be awoken by me today for his evaluation. · Continues to have complaints of lower back and abdominal pain. Today he is complaining of pain in his right lower back radiating around to his abdomen that he describes as a muscle pain, shooting at times and spasms. · Has used 4 doses of his prn morphine (the 20 mg dosage) out of the 6 available in the past 24 hours. During my evaluation today he was actually due to be able to receive a dose of prn pain medication. It does cause him some fatigue.     · Pain rating is 7/10 right lower back radiating to abdomen   · Constipation: + BM 11/24/2020 but is most likely experiencing constipation which is contributing to pain     Current medications:   Current Facility-Administered Medications   Medication Dose Route Frequency Provider Last Rate Last Dose    sodium chloride tablet 2 g  2 g Oral TID  Marcial Claros MD   2 g at 11/25/20 1311    polyethylene glycol (GLYCOLAX) packet 17 g  17 g Oral Once Joanne Muir APRN - CNP        cyclobenzaprine (FLEXERIL) tablet 5 mg  5 mg Oral TID PRN Arthuro Bumpers, APRN - CNP        polyethylene glycol (GLYCOLAX) packet 17 g  17 g Oral Daily Anuj Celestin MD   17 g at 11/25/20 1025    bisacodyl (DULCOLAX) EC tablet 5 mg  5 mg Oral Daily PRN Anuj Celestin MD   5 mg at 11/25/20 1311    bisacodyl (DULCOLAX) suppository 10 mg  10 mg Rectal Daily PRN Anuj Celestin MD        magnesium hydroxide (MILK OF MAGNESIA) 400 MG/5ML suspension 30 mL  30 mL Oral Daily PRN Anuj Celestin MD   30 mL at 11/25/20 1326    tamsulosin (FLOMAX) capsule 0.8 mg  0.8 mg Oral Daily Anuj Celestin MD   0.8 mg at 11/25/20 1024    bumetanide (BUMEX) injection 2 mg  2 mg Intravenous Transplant Nephrology negative  MUSCULOSKELETAL:  positive for  myalgias, arthralgias and pain  NEUROLOGICAL:  negative  BEHAVIOR/PSYCH:  Negative   System review otherwise negative      PHYSICAL EXAM:  BP (!) 110/56   Pulse 84   Temp 98.2 °F (36.8 °C) (Oral)   Resp 18   Ht 5' 10\" (1.778 m)   Wt 163 lb 12.8 oz (74.3 kg)   SpO2 96%   BMI 23.50 kg/m²  I Body mass index is 23.5 kg/m².  I   Wt Readings from Last 1 Encounters:   11/24/20 163 lb 12.8 oz (74.3 kg)      Awake, fatigued   Orientation:   person, place, time  Mood: calm  Affect: appropriate and calm   General appearance: no distress, pale, anxious, thin, ill appearing      Memory:   decraesed thought processing   Attention/Concentration: impaired  Language:  normal     Cranial Nerves:  cranial nerves II-XII are grossly intact  ROM:  Normal ROM in all 4 extremities   Motor Exam:  Motor exam is symmetrical 5 out of 5 all extremities bilaterally  Tone:  normal     + tenderness throughout mid left side of back      Heart: regular rate, regular rhythm, normal S1, S2, no murmurs, rubs, clicks or gallops  Lungs: Diminished, scattered wheezes throughout  Abdomen: soft, tender and guarded, non-distended, active bowel sounds throughout, no masses or organomegaly     Skin: warm and dry, no rash or erythema  Peripheral vascular: Pulses: Normal upper and lower extremity pulses; Edema: + 1 lower extremities- wrapped with ace pressure dressings      DATA    Recent Labs     11/24/20  0924   WBC 16.3*   RBC 3.22*   HGB 10.3*   HCT 30.4*   MCV 94.4*   MCH 32.0   MCHC 33.9      MPV 11.0     Recent Labs     11/23/20  0517  11/24/20  0516  11/24/20  2314 11/25/20  0416 11/25/20  1044   *   < > 124*   < > 124* 127* 128*   K 5.1  --  4.7  --   --  4.0  --    CL 85*  --  89*  --   --  90*  --    CO2 27  --  27  --   --  28  --    BUN 14  --  12  --   --  13  --    CREATININE 0.6  --  0.6  --   --  0.6  --    GLUCOSE 124*  --  114*  --   --  116*  --    CALCIUM 8.3*  --  8.1*  --   -- 7.4*  --     < > = values in this interval not displayed. No results for input(s): POCGLU in the last 72 hours. ASSESSMENT   1. Metastatic pancreatic cancer   2. Cancer associated pain   3. Abdominal pain   4. Hyponatremia   5. Constipation        PLAN  1. Continue pain management   2. Started Flexeril 5 mg every 8 hours prn for spasms   3. Continue MS Contin 30 mg scheduled every 8 hours for  maintenance pain relief  4. Continue tylenol 650 mg every 4 hours as needed for mild pain of 1-3/10, oral morphine liquid solution 10 mg /5 ml solution, 15 mg to 20 mg every 4 hours as needed for moderate to severe breakthrough pain of 410/10   5. Continue Lidoderm patches, Neurontin  6. Bowel program   7. I will not be able to see this patient again until Monday. If patient is discharged any time this weekend then he will need a FU appointment scheduled with Dr. Rojelio Betancur first thing next week- he has enough MS Contin and Dr. Jazzy Salas wrote MS IR.      Spent 25 minutes evaluating and examining patient and completing documentation      LISA Reyes - CNP, 11/25/2020, 3:50 PM

## 2023-08-07 NOTE — PATIENT PROFILE ADULT - FALL HARM RISK - UNIVERSAL INTERVENTIONS
Bed in lowest position, wheels locked, appropriate side rails in place/Call bell, personal items and telephone in reach/Instruct patient to call for assistance before getting out of bed or chair/Non-slip footwear when patient is out of bed/[place] to call system/Physically safe environment - no spills, clutter or unnecessary equipment/Purposeful Proactive Rounding/Room/bathroom lighting operational, light cord in reach

## 2023-08-07 NOTE — CONSULT NOTE ADULT - SUBJECTIVE AND OBJECTIVE BOX
[facility] DIVISION OF KIDNEY DISEASES AND HYPERTENSION -- INITIAL CONSULT NOTE  --------------------------------------------------------------------------------  HPI:  63 y.o F w/ PMHx of HTN, HLD and IBS here for directed kidney donation to her . Patient is now s/p kidney donation. Seen in the PACU post op. She denies fever, chills, chest pain, shortness of breath, cough, wheezing, sore throat and change in taste/smell.    PAST HISTORY  --------------------------------------------------------------------------------  PAST MEDICAL & SURGICAL HISTORY:  Hypertension      Hyperlipidemia      2019 novel coronavirus disease (COVID-19)      History of IBS      H/O colonoscopy      S/P tonsillectomy       (normal spontaneous vaginal delivery)        FAMILY HISTORY:  FH: hypertension (Father, Mother)    FH: multiple myeloma (Mother)    FH: kidney failure (Mother)      PAST SOCIAL HISTORY:    ALLERGIES & MEDICATIONS  --------------------------------------------------------------------------------  Allergies    Shellfish (Swelling; Rash)  No Known Drug Allergies    Intolerances      Standing Inpatient Medications  chlorhexidine 2% Cloths 1 Application(s) Topical once  heparin   Injectable 5000 Unit(s) SubCutaneous every 12 hours  HYDROmorphone PCA (1 mG/mL) 30 milliLiter(s) PCA Continuous PCA Continuous  lactated ringers. 1000 milliLiter(s) IV Continuous <Continuous>  polyethylene glycol 3350 17 Gram(s) Oral daily  senna 2 Tablet(s) Oral at bedtime    PRN Inpatient Medications  HYDROmorphone  Injectable 0.5 milliGRAM(s) IV Push every 10 minutes PRN  HYDROmorphone PCA (1 mG/mL) Rescue Clinician Bolus 0.5 milliGRAM(s) IV Push every 15 minutes PRN  nalbuphine Injectable 2.5 milliGRAM(s) IV Push every 6 hours PRN  naloxone Injectable 0.1 milliGRAM(s) IV Push every 3 minutes PRN  ondansetron Injectable 4 milliGRAM(s) IV Push every 6 hours PRN  ondansetron Injectable 4 milliGRAM(s) IV Push once PRN  oxyCODONE    IR 5 milliGRAM(s) Oral once PRN  traMADol 25 milliGRAM(s) Oral every 6 hours PRN  traMADol 50 milliGRAM(s) Oral every 6 hours PRN      REVIEW OF SYSTEMS  --------------------------------------------------------------------------------  per above     VITALS/PHYSICAL EXAM  --------------------------------------------------------------------------------  T(C): 36 (23 @ 12:00), Max: 36.8 (23 @ 06:45)  HR: 67 (23 @ 12:45) (61 - 79)  BP: 130/67 (23 @ 12:45) (130/67 - 171/93)  RR: 16 (23 @ 12:45) (16 - 18)  SpO2: 95% (23 @ 12:45) (95% - 98%)  Wt(kg): --  Height (cm): 157.5 (23 @ 06:57)  Weight (kg): 78.3 (23 @ 06:57)  BMI (kg/m2): 31.6 (23 @ 06:57)  BSA (m2): 1.8 (23 @ 06:57)      23 @ 07:01  -  23 @ 13:53  --------------------------------------------------------  IN: 125 mL / OUT: 700 mL / NET: -575 mL      Physical Exam:  	Gen: NAD, well-appearing  	HEENT: atraumatic  	Pulm: CTA B/L  	CV: RRR, S1S2; no rub  	Back: No spinal or CVA tenderness; no sacral edema  	Abd: +BS, soft, +tender; not nondistended  	: No suprapubic tenderness +johnson draining dilute urine  	UE: Warm, FROM, no clubbing, intact strength; no edema; no asterixis  	LE: Warm, FROM, no clubbing, intact strength; no edema  	Neuro: No focal deficits, intact gait  	Psych: Normal affect and mood  	Skin: Warm, without rashes  	Vascular access:    LABS/STUDIES  --------------------------------------------------------------------------------              13.7   14.23 >-----------<  179      [23 @ 12:57]              41.1     140  |  100  |  10  ----------------------------<  198      [23 @ 12:57]  3.7   |  22  |  0.76        Ca     8.2     [23 @ 12:57]      Mg     2.2     [23 @ 12:57]      Phos  3.6     [23 @ 12:57]            Creatinine Trend:  SCr 0.76 [ @ 12:57]    Urinalysis - [23 @ 12:57]      Color  / Appearance  / SG  / pH       Gluc 198 / Ketone   / Bili  / Urobili        Blood  / Protein  / Leuk Est  / Nitrite       RBC  / WBC  / Hyaline  / Gran  / Sq Epi  / Non Sq Epi  / Bacteria             Tacrolimus  Cyclosporine  Sirolimus  Mycophenolate  BK PCR  CMV PCR  Parvo PCR  EBV PCR [facility] DIVISION OF KIDNEY DISEASES AND HYPERTENSION -- INITIAL CONSULT NOTE  --------------------------------------------------------------------------------  HPI:  63 y.o F w/ PMHx of HTN, HLD and IBS here for directed kidney donation to her . Patient is now s/p kidney donation. Seen in the PACU post op. She denies acute complaints except pain with movement. Denies chills, chest pain, shortness of breath, cough, wheezing, and sore throat.    PAST HISTORY  --------------------------------------------------------------------------------  PAST MEDICAL & SURGICAL HISTORY:  Hypertension      Hyperlipidemia      2019 novel coronavirus disease (COVID-19)      History of IBS      H/O colonoscopy      S/P tonsillectomy       (normal spontaneous vaginal delivery)        FAMILY HISTORY:  FH: hypertension (Father, Mother)    FH: multiple myeloma (Mother)    FH: kidney failure (Mother)      PAST SOCIAL HISTORY:    ALLERGIES & MEDICATIONS  --------------------------------------------------------------------------------  Allergies    Shellfish (Swelling; Rash)  No Known Drug Allergies    Intolerances      Standing Inpatient Medications  chlorhexidine 2% Cloths 1 Application(s) Topical once  heparin   Injectable 5000 Unit(s) SubCutaneous every 12 hours  HYDROmorphone PCA (1 mG/mL) 30 milliLiter(s) PCA Continuous PCA Continuous  lactated ringers. 1000 milliLiter(s) IV Continuous <Continuous>  polyethylene glycol 3350 17 Gram(s) Oral daily  senna 2 Tablet(s) Oral at bedtime    PRN Inpatient Medications  HYDROmorphone  Injectable 0.5 milliGRAM(s) IV Push every 10 minutes PRN  HYDROmorphone PCA (1 mG/mL) Rescue Clinician Bolus 0.5 milliGRAM(s) IV Push every 15 minutes PRN  nalbuphine Injectable 2.5 milliGRAM(s) IV Push every 6 hours PRN  naloxone Injectable 0.1 milliGRAM(s) IV Push every 3 minutes PRN  ondansetron Injectable 4 milliGRAM(s) IV Push every 6 hours PRN  ondansetron Injectable 4 milliGRAM(s) IV Push once PRN  oxyCODONE    IR 5 milliGRAM(s) Oral once PRN  traMADol 25 milliGRAM(s) Oral every 6 hours PRN  traMADol 50 milliGRAM(s) Oral every 6 hours PRN      REVIEW OF SYSTEMS  --------------------------------------------------------------------------------  per above     VITALS/PHYSICAL EXAM  --------------------------------------------------------------------------------  T(C): 36 (23 @ 12:00), Max: 36.8 (23 @ 06:45)  HR: 67 (23 @ 12:45) (61 - 79)  BP: 130/67 (23 @ 12:45) (130/67 - 171/93)  RR: 16 (23 @ 12:45) (16 - 18)  SpO2: 95% (23 @ 12:45) (95% - 98%)  Wt(kg): --  Height (cm): 157.5 (23 @ 06:57)  Weight (kg): 78.3 (23 @ 06:57)  BMI (kg/m2): 31.6 (23 @ 06:57)  BSA (m2): 1.8 (23 @ 06:57)      23 @ 07:01  -  23 @ 13:53  --------------------------------------------------------  IN: 125 mL / OUT: 700 mL / NET: -575 mL      Physical Exam:  	Gen: NAD, well-appearing  	HEENT: atraumatic  	Pulm: CTA B/L  	CV: RRR, S1S2; no rub  	Back: No spinal or CVA tenderness; no sacral edema  	Abd: +BS, soft, +tender; not nondistended  	: No suprapubic tenderness +johnson draining dilute urine  	UE: Warm, FROM, no clubbing, intact strength; no edema; no asterixis  	LE: Warm, FROM, no clubbing, intact strength; no edema  	Neuro: No focal deficits, intact gait  	Psych: Normal affect and mood  	Skin: Warm, without rashes  	Vascular access:    LABS/STUDIES  --------------------------------------------------------------------------------              13.7   14.23 >-----------<  179      [23 @ 12:57]              41.1     140  |  100  |  10  ----------------------------<  198      [23 @ 12:57]  3.7   |  22  |  0.76        Ca     8.2     [23 @ 12:57]      Mg     2.2     [23 @ 12:57]      Phos  3.6     [23 @ 12:57]            Creatinine Trend:  SCr 0.76 [ @ 12:57]    Urinalysis - [23 @ 12:57]      Color  / Appearance  / SG  / pH       Gluc 198 / Ketone   / Bili  / Urobili        Blood  / Protein  / Leuk Est  / Nitrite       RBC  / WBC  / Hyaline  / Gran  / Sq Epi  / Non Sq Epi  / Bacteria             Tacrolimus  Cyclosporine  Sirolimus  Mycophenolate  BK PCR  CMV PCR  Parvo PCR  EBV PCR

## 2023-08-07 NOTE — BRIEF OPERATIVE NOTE - OPERATION/FINDINGS
Laparoscopic R living donor nephrectomy. Artery, ureter, vein stapled. Gonadal vein clipped and cut. Backtable preparation for recipient by Dr. Newby. 15-Sep-2021

## 2023-08-08 ENCOUNTER — TRANSCRIPTION ENCOUNTER (OUTPATIENT)
Age: 63
End: 2023-08-08

## 2023-08-08 LAB
A1C WITH ESTIMATED AVERAGE GLUCOSE RESULT: 5.2 % — SIGNIFICANT CHANGE UP (ref 4–5.6)
ANION GAP SERPL CALC-SCNC: 13 MMOL/L — SIGNIFICANT CHANGE UP (ref 5–17)
BASOPHILS # BLD AUTO: 0.02 K/UL — SIGNIFICANT CHANGE UP (ref 0–0.2)
BASOPHILS NFR BLD AUTO: 0.2 % — SIGNIFICANT CHANGE UP (ref 0–2)
BUN SERPL-MCNC: 14 MG/DL — SIGNIFICANT CHANGE UP (ref 7–23)
CALCIUM SERPL-MCNC: 7.7 MG/DL — LOW (ref 8.4–10.5)
CHLORIDE SERPL-SCNC: 102 MMOL/L — SIGNIFICANT CHANGE UP (ref 96–108)
CO2 SERPL-SCNC: 24 MMOL/L — SIGNIFICANT CHANGE UP (ref 22–31)
CREAT SERPL-MCNC: 1.12 MG/DL — SIGNIFICANT CHANGE UP (ref 0.5–1.3)
EGFR: 55 ML/MIN/1.73M2 — LOW
EOSINOPHIL # BLD AUTO: 0 K/UL — SIGNIFICANT CHANGE UP (ref 0–0.5)
EOSINOPHIL NFR BLD AUTO: 0 % — SIGNIFICANT CHANGE UP (ref 0–6)
ESTIMATED AVERAGE GLUCOSE: 103 MG/DL — SIGNIFICANT CHANGE UP (ref 68–114)
GLUCOSE SERPL-MCNC: 111 MG/DL — HIGH (ref 70–99)
HCT VFR BLD CALC: 34.5 % — SIGNIFICANT CHANGE UP (ref 34.5–45)
HCT VFR BLD CALC: 36.4 % — SIGNIFICANT CHANGE UP (ref 34.5–45)
HGB BLD-MCNC: 11.6 G/DL — SIGNIFICANT CHANGE UP (ref 11.5–15.5)
HGB BLD-MCNC: 12.2 G/DL — SIGNIFICANT CHANGE UP (ref 11.5–15.5)
IMM GRANULOCYTES NFR BLD AUTO: 0.6 % — SIGNIFICANT CHANGE UP (ref 0–0.9)
LYMPHOCYTES # BLD AUTO: 1.62 K/UL — SIGNIFICANT CHANGE UP (ref 1–3.3)
LYMPHOCYTES # BLD AUTO: 12.9 % — LOW (ref 13–44)
MAGNESIUM SERPL-MCNC: 1.9 MG/DL — SIGNIFICANT CHANGE UP (ref 1.6–2.6)
MCHC RBC-ENTMCNC: 29.5 PG — SIGNIFICANT CHANGE UP (ref 27–34)
MCHC RBC-ENTMCNC: 30 PG — SIGNIFICANT CHANGE UP (ref 27–34)
MCHC RBC-ENTMCNC: 33.5 GM/DL — SIGNIFICANT CHANGE UP (ref 32–36)
MCHC RBC-ENTMCNC: 33.6 GM/DL — SIGNIFICANT CHANGE UP (ref 32–36)
MCV RBC AUTO: 87.8 FL — SIGNIFICANT CHANGE UP (ref 80–100)
MCV RBC AUTO: 89.7 FL — SIGNIFICANT CHANGE UP (ref 80–100)
MONOCYTES # BLD AUTO: 1.13 K/UL — HIGH (ref 0–0.9)
MONOCYTES NFR BLD AUTO: 9 % — SIGNIFICANT CHANGE UP (ref 2–14)
NEUTROPHILS # BLD AUTO: 9.69 K/UL — HIGH (ref 1.8–7.4)
NEUTROPHILS NFR BLD AUTO: 77.3 % — HIGH (ref 43–77)
NRBC # BLD: 0 /100 WBCS — SIGNIFICANT CHANGE UP (ref 0–0)
NRBC # BLD: 0 /100 WBCS — SIGNIFICANT CHANGE UP (ref 0–0)
PHOSPHATE SERPL-MCNC: 3.3 MG/DL — SIGNIFICANT CHANGE UP (ref 2.5–4.5)
PLATELET # BLD AUTO: 186 K/UL — SIGNIFICANT CHANGE UP (ref 150–400)
PLATELET # BLD AUTO: 195 K/UL — SIGNIFICANT CHANGE UP (ref 150–400)
POTASSIUM SERPL-MCNC: 3.6 MMOL/L — SIGNIFICANT CHANGE UP (ref 3.5–5.3)
POTASSIUM SERPL-SCNC: 3.6 MMOL/L — SIGNIFICANT CHANGE UP (ref 3.5–5.3)
RBC # BLD: 3.93 M/UL — SIGNIFICANT CHANGE UP (ref 3.8–5.2)
RBC # BLD: 4.06 M/UL — SIGNIFICANT CHANGE UP (ref 3.8–5.2)
RBC # FLD: 12.8 % — SIGNIFICANT CHANGE UP (ref 10.3–14.5)
RBC # FLD: 13 % — SIGNIFICANT CHANGE UP (ref 10.3–14.5)
SODIUM SERPL-SCNC: 139 MMOL/L — SIGNIFICANT CHANGE UP (ref 135–145)
WBC # BLD: 10.74 K/UL — HIGH (ref 3.8–10.5)
WBC # BLD: 12.54 K/UL — HIGH (ref 3.8–10.5)
WBC # FLD AUTO: 10.74 K/UL — HIGH (ref 3.8–10.5)
WBC # FLD AUTO: 12.54 K/UL — HIGH (ref 3.8–10.5)

## 2023-08-08 PROCEDURE — 99232 SBSQ HOSP IP/OBS MODERATE 35: CPT | Mod: GC

## 2023-08-08 RX ORDER — SIMETHICONE 80 MG/1
80 TABLET, CHEWABLE ORAL EVERY 4 HOURS
Refills: 0 | Status: DISCONTINUED | OUTPATIENT
Start: 2023-08-08 | End: 2023-08-08

## 2023-08-08 RX ORDER — CALCIUM CARBONATE 500(1250)
2 TABLET ORAL THREE TIMES A DAY
Refills: 0 | Status: DISCONTINUED | OUTPATIENT
Start: 2023-08-08 | End: 2023-08-09

## 2023-08-08 RX ORDER — SODIUM CHLORIDE 9 MG/ML
1000 INJECTION, SOLUTION INTRAVENOUS ONCE
Refills: 0 | Status: DISCONTINUED | OUTPATIENT
Start: 2023-08-08 | End: 2023-08-08

## 2023-08-08 RX ORDER — CLONAZEPAM 1 MG
0.5 TABLET ORAL ONCE
Refills: 0 | Status: DISCONTINUED | OUTPATIENT
Start: 2023-08-08 | End: 2023-08-08

## 2023-08-08 RX ORDER — ACETAMINOPHEN 500 MG
1000 TABLET ORAL ONCE
Refills: 0 | Status: COMPLETED | OUTPATIENT
Start: 2023-08-08 | End: 2023-08-08

## 2023-08-08 RX ORDER — SODIUM CHLORIDE 9 MG/ML
500 INJECTION, SOLUTION INTRAVENOUS ONCE
Refills: 0 | Status: COMPLETED | OUTPATIENT
Start: 2023-08-08 | End: 2023-08-08

## 2023-08-08 RX ORDER — METOCLOPRAMIDE HCL 10 MG
5 TABLET ORAL ONCE
Refills: 0 | Status: DISCONTINUED | OUTPATIENT
Start: 2023-08-08 | End: 2023-08-08

## 2023-08-08 RX ORDER — SIMETHICONE 80 MG/1
80 TABLET, CHEWABLE ORAL EVERY 4 HOURS
Refills: 0 | Status: DISCONTINUED | OUTPATIENT
Start: 2023-08-08 | End: 2023-08-09

## 2023-08-08 RX ADMIN — ATORVASTATIN CALCIUM 10 MILLIGRAM(S): 80 TABLET, FILM COATED ORAL at 21:52

## 2023-08-08 RX ADMIN — Medication 400 MILLIGRAM(S): at 17:26

## 2023-08-08 RX ADMIN — SENNA PLUS 2 TABLET(S): 8.6 TABLET ORAL at 21:52

## 2023-08-08 RX ADMIN — SIMETHICONE 80 MILLIGRAM(S): 80 TABLET, CHEWABLE ORAL at 17:26

## 2023-08-08 RX ADMIN — Medication 2 TABLET(S): at 23:22

## 2023-08-08 RX ADMIN — HYDROMORPHONE HYDROCHLORIDE 30 MILLILITER(S): 2 INJECTION INTRAMUSCULAR; INTRAVENOUS; SUBCUTANEOUS at 07:48

## 2023-08-08 RX ADMIN — HEPARIN SODIUM 5000 UNIT(S): 5000 INJECTION INTRAVENOUS; SUBCUTANEOUS at 21:52

## 2023-08-08 RX ADMIN — Medication 1000 MILLIGRAM(S): at 17:56

## 2023-08-08 RX ADMIN — SIMETHICONE 80 MILLIGRAM(S): 80 TABLET, CHEWABLE ORAL at 21:52

## 2023-08-08 RX ADMIN — HEPARIN SODIUM 5000 UNIT(S): 5000 INJECTION INTRAVENOUS; SUBCUTANEOUS at 09:43

## 2023-08-08 RX ADMIN — Medication 0.5 MILLIGRAM(S): at 01:42

## 2023-08-08 RX ADMIN — SODIUM CHLORIDE 1000 MILLILITER(S): 9 INJECTION, SOLUTION INTRAVENOUS at 01:15

## 2023-08-08 RX ADMIN — Medication 2 TABLET(S): at 14:45

## 2023-08-08 RX ADMIN — SIMETHICONE 80 MILLIGRAM(S): 80 TABLET, CHEWABLE ORAL at 14:46

## 2023-08-08 RX ADMIN — SIMETHICONE 80 MILLIGRAM(S): 80 TABLET, CHEWABLE ORAL at 01:42

## 2023-08-08 RX ADMIN — POLYETHYLENE GLYCOL 3350 17 GRAM(S): 17 POWDER, FOR SOLUTION ORAL at 14:46

## 2023-08-08 RX ADMIN — SIMETHICONE 80 MILLIGRAM(S): 80 TABLET, CHEWABLE ORAL at 09:42

## 2023-08-08 RX ADMIN — ONDANSETRON 4 MILLIGRAM(S): 8 TABLET, FILM COATED ORAL at 07:50

## 2023-08-08 NOTE — DIETITIAN INITIAL EVALUATION ADULT - ORAL INTAKE PTA/DIET HISTORY
Pt confirms no known food allergies/intolerances. Reports having a good appetite and PO intakes at home. Reports watching her carbohydrate intake at home. Pt denies nausea, vomiting, diarrhea, or constipation. Denies difficulty chewing/swallowing. Denies any vitamin/mineral use at home, pt reports not taking oral nutritional supplement at home as well.

## 2023-08-08 NOTE — DIETITIAN INITIAL EVALUATION ADULT - PERTINENT LABORATORY DATA
08-08    139  |  102  |  14  ----------------------------<  111<H>  3.6   |  24  |  1.12    Ca    7.7<L>      08 Aug 2023 07:44  Phos  3.3     08-08  Mg     1.9     08-08      POCT Blood Glucose.: 114 mg/dL (08-08-23 @ 12:58)  POCT Blood Glucose.: 127 mg/dL (08-08-23 @ 08:09)  POCT Blood Glucose.: 144 mg/dL (08-07-23 @ 22:44)    A1C with Estimated Average Glucose Result: 5.2 % (08-08-23 @ 07:47)

## 2023-08-08 NOTE — DIETITIAN INITIAL EVALUATION ADULT - REASON FOR ADMISSION
Chart reviewed, Events Noted  "63 y.o F w/ PMHx of HTN, HLD and IBS here for directed kidney donation to her . Patient is now s/p kidney donation. Seen in the PACU post op. She denies acute complaints except pain with movement."

## 2023-08-08 NOTE — PROGRESS NOTE ADULT - ASSESSMENT
63 y.o F w/ PMHx of HTN, HLD and IBS here for directed kidney donation to her . Now s/p kidney donation. 63 y.o F w/ PMHx of HTN, HLD and IBS here for directed kidney donation to her . Now s/p laparoscopic kidney donation.

## 2023-08-08 NOTE — DISCHARGE NOTE PROVIDER - HOSPITAL COURSE
Daniel Amaya is a 62 y/o F with past medical history significant for HLD, HTN (diagnosed at age 59), IBS with past surgical history significant for tonsillectomy presents to [facility] on 8/7/23 for planned laparoscopic RIGHT donor nephrectomy with Dr De Los Santos.     Now s/p laparoscopic RIGHT donor nephrectomy on 8/8/2023. Summers removed, passed TOV. Pain controlled, tolerating diet, ambulating without difficultly.     Patient seen by the multidisciplinary renal transplant team and deemed stable for discharge today. Daniel Amaya is a 64 y/o F with past medical history significant for HLD, HTN (diagnosed at age 59), IBS with past surgical history significant for tonsillectomy presents to [facility] on 8/7/23 for planned laparoscopic RIGHT donor nephrectomy with Dr De Los Santos.     Now s/p laparoscopic RIGHT donor nephrectomy on 8/8/2023. Summers removed, passed TOV. Pain controlled, tolerating diet, ambulating without difficultly.     Patient seen by the multidisciplinary renal transplant team and deemed stable for discharge today.   Follow up in clinic in one week

## 2023-08-08 NOTE — DIETITIAN INITIAL EVALUATION ADULT - ADD RECOMMEND
1) Consider diet advancement to Regular diet; follow up with outpatient MD as appropriate post-discharge.   2) Donor education on balanced nutrition provided prior to discharge.   3) Monitor PO intake, GI tolerance, skin integrity and labs. RD remains available if needed, pt is aware.

## 2023-08-08 NOTE — DIETITIAN INITIAL EVALUATION ADULT - PERTINENT MEDS FT
MEDICATIONS  (STANDING):  atorvastatin 10 milliGRAM(s) Oral at bedtime  chlorhexidine 2% Cloths 1 Application(s) Topical once  heparin   Injectable 5000 Unit(s) SubCutaneous every 12 hours  insulin lispro (ADMELOG) corrective regimen sliding scale   SubCutaneous Before meals and at bedtime  lactated ringers. 1000 milliLiter(s) (125 mL/Hr) IV Continuous <Continuous>  polyethylene glycol 3350 17 Gram(s) Oral daily  senna 2 Tablet(s) Oral at bedtime  simethicone 80 milliGRAM(s) Chew every 4 hours    MEDICATIONS  (PRN):  calcium carbonate    500 mG (Tums) Chewable 2 Tablet(s) Chew three times a day PRN Gas  dicyclomine 20 milliGRAM(s) Oral four times a day before meals PRN spasm  ondansetron Injectable 4 milliGRAM(s) IV Push every 4 hours PRN Nausea and/or Vomiting  traMADol 25 milliGRAM(s) Oral every 6 hours PRN Moderate Pain (4 - 6)  traMADol 50 milliGRAM(s) Oral every 6 hours PRN Severe Pain (7 - 10)

## 2023-08-08 NOTE — DIETITIAN INITIAL EVALUATION ADULT - NUTRITION DIAGNOSIS
Message  Received: Today  Leyla Leal NP sent to Maritza Damon LPN  Caller: Unspecified (2 days ago, 12:24 PM)  Leg studies negative for blood clots. Very mild leaky veins on right leg, left leg is fine. Can trial OTC compression stockings       Called pt,verified pt with two pt identifiers, advised pt her venous doppler is negative for blood clots-no blood clots. Advised very  mild leaky veins on rt leg, no issues with the lf leg. Advised she can try OTC compression stockings to see if that helps. Pt verbalized understanding. yes...

## 2023-08-08 NOTE — PHYSICAL THERAPY INITIAL EVALUATION ADULT - PERTINENT HX OF CURRENT PROBLEM, REHAB EVAL
64 y/o female PMHx HTN (controlled with monotherapy), HLD and IBS. Underwent laparoscopic right nephrectomy for living donation to her  on 8/7/23

## 2023-08-08 NOTE — DIETITIAN INITIAL EVALUATION ADULT - NSICDXPASTMEDICALHX_GEN_ALL_CORE_FT
PAST MEDICAL HISTORY:  2019 novel coronavirus disease (COVID-19)     History of IBS     Hyperlipidemia     Hypertension

## 2023-08-08 NOTE — PHYSICAL THERAPY INITIAL EVALUATION ADULT - PLANNED THERAPY INTERVENTIONS, PT EVAL
GOAL: Pt will negotiate up/down 1 flight of steps with one handrail ascending independently in 2 weeks/balance training/gait training/strengthening/transfer training

## 2023-08-08 NOTE — DIETITIAN INITIAL EVALUATION ADULT - NSICDXPASTSURGICALHX_GEN_ALL_CORE_FT
PAST SURGICAL HISTORY:  H/O colonoscopy      (normal spontaneous vaginal delivery)     S/P tonsillectomy

## 2023-08-08 NOTE — DIETITIAN INITIAL EVALUATION ADULT - REASON INDICATOR FOR ASSESSMENT
Pt seen for post kidney transplant donor nutrition evaluation per department protocol.   Information obtained from: Review of pt's current medical record, interview with pt in her assigned room on 9MONTI

## 2023-08-08 NOTE — DISCHARGE NOTE PROVIDER - NSDCFUSCHEDAPPT_GEN_ALL_CORE_FT
[facility]  TRANSPLANT [address] [facility]   Scheduled Appointment: 08/15/2023    Alfonzo De Los Santos  [facility]  TRANSPLANT [address] [facility]   Scheduled Appointment: 08/15/2023

## 2023-08-08 NOTE — DISCHARGE NOTE PROVIDER - NSDCMRMEDTOKEN_GEN_ALL_CORE_FT
Lipitor 10 mg oral tablet: 1 orally once a day (at bedtime)  olmesartan 40 mg oral tablet: 1 orally once a day   acetaminophen 325 mg oral tablet: 2 tab(s) orally every 6 hours as needed for Temp greater or equal to 38C (100.4F), Mild Pain (1 - 3)  atorvastatin 10 mg oral tablet: 1 tab(s) orally once a day (at bedtime)  olmesartan 40 mg oral tablet: 1 orally once a day  senna leaf extract oral tablet: 2 tab(s) orally once a day (at bedtime)

## 2023-08-08 NOTE — DISCHARGE NOTE PROVIDER - CARE PROVIDER_API CALL
Alfonzo De Los Santos Mg  Surgery  [address] [place] [id number]  Phone: [phone]  Fax: [phone]  Follow Up Time:

## 2023-08-08 NOTE — PHYSICAL THERAPY INITIAL EVALUATION ADULT - ADDITIONAL COMMENTS
lives with  in house with 10 steps to enter +BHR, 1 FOS to 2nd floor bedroom +RHR ascending; IND at baseline with mobility and ADLs, no AD. [place] .

## 2023-08-08 NOTE — DISCHARGE NOTE PROVIDER - NSDCCPCAREPLAN_GEN_ALL_CORE_FT
PRINCIPAL DISCHARGE DIAGNOSIS  Diagnosis: Kidney donor  Assessment and Plan of Treatment: - Avoid heavy lifting aything over 5lbs for six weeks following your surgery date. Do NOT drive a car or operate machinery unless cleared by your transplant surgeon during your follow up visit. Avoid straining, use stool softners as needed. Stop stool softners if diarrhea develops.   - Call transplant clinic ([phone]) if you develop fever (over 100.4F), increased abdominal pain, redness/swelling or bleeding around your incision site  - Call transplant clinic if you have difficulty urinating, notice decrease in urine output or blood in urine.   - Bathing: Shower is allowed, you can let soap and water flow over your incision; do NOT rub the area. Bath is NOT allowed until your incision is healed and you have been cleared by your transplant surgeon.   - Continue to practice social distancing and wear face mask when appropriate         SECONDARY DISCHARGE DIAGNOSES  Diagnosis: Hypertension  Assessment and Plan of Treatment: Be sure to follow a low salt diet, avoid caffeine, reduce alcohol intake.  If you have been prescribed antihypertensive medications to control your blood pressure, be sure to take them every day as prescribed and do not miss any doses, the medications do not work if they are not taken consistently. Follow up with your Primary Care Doctor and have your Blood Pressure checked regularly.

## 2023-08-08 NOTE — PROGRESS NOTE ADULT - ASSESSMENT
64 y/o F POD 1 from R donor living nephrectomy.    -Keep on CLD  -Keep on mIVF  -Summers out, TOV  -Start Bentyl 20mg Q6 PRN  -Standing simethicone for gas pain  -PCA off  -OOB    D/w Dr. Nielsen and multidisciplinary transplant team    Transplant Surgery

## 2023-08-09 ENCOUNTER — TRANSCRIPTION ENCOUNTER (OUTPATIENT)
Age: 63
End: 2023-08-09

## 2023-08-09 VITALS
TEMPERATURE: 99 F | OXYGEN SATURATION: 96 % | SYSTOLIC BLOOD PRESSURE: 167 MMHG | DIASTOLIC BLOOD PRESSURE: 83 MMHG | RESPIRATION RATE: 18 BRPM | HEART RATE: 66 BPM

## 2023-08-09 LAB
ANION GAP SERPL CALC-SCNC: 11 MMOL/L — SIGNIFICANT CHANGE UP (ref 5–17)
BASOPHILS # BLD AUTO: 0.03 K/UL — SIGNIFICANT CHANGE UP (ref 0–0.2)
BASOPHILS NFR BLD AUTO: 0.3 % — SIGNIFICANT CHANGE UP (ref 0–2)
BUN SERPL-MCNC: 11 MG/DL — SIGNIFICANT CHANGE UP (ref 7–23)
CALCIUM SERPL-MCNC: 9 MG/DL — SIGNIFICANT CHANGE UP (ref 8.4–10.5)
CHLORIDE SERPL-SCNC: 104 MMOL/L — SIGNIFICANT CHANGE UP (ref 96–108)
CO2 SERPL-SCNC: 24 MMOL/L — SIGNIFICANT CHANGE UP (ref 22–31)
CREAT SERPL-MCNC: 1.01 MG/DL — SIGNIFICANT CHANGE UP (ref 0.5–1.3)
EGFR: 62 ML/MIN/1.73M2 — SIGNIFICANT CHANGE UP
EOSINOPHIL # BLD AUTO: 0.02 K/UL — SIGNIFICANT CHANGE UP (ref 0–0.5)
EOSINOPHIL NFR BLD AUTO: 0.2 % — SIGNIFICANT CHANGE UP (ref 0–6)
GLUCOSE SERPL-MCNC: 96 MG/DL — SIGNIFICANT CHANGE UP (ref 70–99)
HCT VFR BLD CALC: 35.2 % — SIGNIFICANT CHANGE UP (ref 34.5–45)
HGB BLD-MCNC: 11.6 G/DL — SIGNIFICANT CHANGE UP (ref 11.5–15.5)
IMM GRANULOCYTES NFR BLD AUTO: 0.4 % — SIGNIFICANT CHANGE UP (ref 0–0.9)
LYMPHOCYTES # BLD AUTO: 1.78 K/UL — SIGNIFICANT CHANGE UP (ref 1–3.3)
LYMPHOCYTES # BLD AUTO: 19 % — SIGNIFICANT CHANGE UP (ref 13–44)
MAGNESIUM SERPL-MCNC: 1.8 MG/DL — SIGNIFICANT CHANGE UP (ref 1.6–2.6)
MCHC RBC-ENTMCNC: 29.8 PG — SIGNIFICANT CHANGE UP (ref 27–34)
MCHC RBC-ENTMCNC: 33 GM/DL — SIGNIFICANT CHANGE UP (ref 32–36)
MCV RBC AUTO: 90.5 FL — SIGNIFICANT CHANGE UP (ref 80–100)
MONOCYTES # BLD AUTO: 0.63 K/UL — SIGNIFICANT CHANGE UP (ref 0–0.9)
MONOCYTES NFR BLD AUTO: 6.7 % — SIGNIFICANT CHANGE UP (ref 2–14)
NEUTROPHILS # BLD AUTO: 6.86 K/UL — SIGNIFICANT CHANGE UP (ref 1.8–7.4)
NEUTROPHILS NFR BLD AUTO: 73.4 % — SIGNIFICANT CHANGE UP (ref 43–77)
NRBC # BLD: 0 /100 WBCS — SIGNIFICANT CHANGE UP (ref 0–0)
PHOSPHATE SERPL-MCNC: 2.5 MG/DL — SIGNIFICANT CHANGE UP (ref 2.5–4.5)
PLATELET # BLD AUTO: 170 K/UL — SIGNIFICANT CHANGE UP (ref 150–400)
POTASSIUM SERPL-MCNC: 3.8 MMOL/L — SIGNIFICANT CHANGE UP (ref 3.5–5.3)
POTASSIUM SERPL-SCNC: 3.8 MMOL/L — SIGNIFICANT CHANGE UP (ref 3.5–5.3)
RBC # BLD: 3.89 M/UL — SIGNIFICANT CHANGE UP (ref 3.8–5.2)
RBC # FLD: 13.2 % — SIGNIFICANT CHANGE UP (ref 10.3–14.5)
SODIUM SERPL-SCNC: 139 MMOL/L — SIGNIFICANT CHANGE UP (ref 135–145)
WBC # BLD: 9.36 K/UL — SIGNIFICANT CHANGE UP (ref 3.8–10.5)
WBC # FLD AUTO: 9.36 K/UL — SIGNIFICANT CHANGE UP (ref 3.8–10.5)

## 2023-08-09 PROCEDURE — 36415 COLL VENOUS BLD VENIPUNCTURE: CPT

## 2023-08-09 PROCEDURE — 85027 COMPLETE CBC AUTOMATED: CPT

## 2023-08-09 PROCEDURE — 80048 BASIC METABOLIC PNL TOTAL CA: CPT

## 2023-08-09 PROCEDURE — 83036 HEMOGLOBIN GLYCOSYLATED A1C: CPT

## 2023-08-09 PROCEDURE — 84100 ASSAY OF PHOSPHORUS: CPT

## 2023-08-09 PROCEDURE — 83735 ASSAY OF MAGNESIUM: CPT

## 2023-08-09 PROCEDURE — 86849 IMMUNOLOGY PROCEDURE: CPT

## 2023-08-09 PROCEDURE — 82962 GLUCOSE BLOOD TEST: CPT

## 2023-08-09 PROCEDURE — 99232 SBSQ HOSP IP/OBS MODERATE 35: CPT | Mod: GC

## 2023-08-09 PROCEDURE — C1889: CPT

## 2023-08-09 PROCEDURE — C9399: CPT

## 2023-08-09 PROCEDURE — 85025 COMPLETE CBC W/AUTO DIFF WBC: CPT

## 2023-08-09 PROCEDURE — 97161 PT EVAL LOW COMPLEX 20 MIN: CPT

## 2023-08-09 RX ORDER — ACETAMINOPHEN 500 MG
2 TABLET ORAL
Qty: 0 | Refills: 0 | DISCHARGE
Start: 2023-08-09

## 2023-08-09 RX ORDER — SENNA PLUS 8.6 MG/1
2 TABLET ORAL
Qty: 0 | Refills: 0 | DISCHARGE
Start: 2023-08-09

## 2023-08-09 RX ORDER — HYDRALAZINE HCL 50 MG
10 TABLET ORAL ONCE
Refills: 0 | Status: DISCONTINUED | OUTPATIENT
Start: 2023-08-09 | End: 2023-08-09

## 2023-08-09 RX ORDER — ATORVASTATIN CALCIUM 80 MG/1
1 TABLET, FILM COATED ORAL
Refills: 0 | DISCHARGE

## 2023-08-09 RX ORDER — ATORVASTATIN CALCIUM 80 MG/1
1 TABLET, FILM COATED ORAL
Qty: 0 | Refills: 0 | DISCHARGE
Start: 2023-08-09

## 2023-08-09 RX ORDER — ACETAMINOPHEN 500 MG
975 TABLET ORAL EVERY 6 HOURS
Refills: 0 | Status: DISCONTINUED | OUTPATIENT
Start: 2023-08-09 | End: 2023-08-09

## 2023-08-09 RX ADMIN — SIMETHICONE 80 MILLIGRAM(S): 80 TABLET, CHEWABLE ORAL at 11:28

## 2023-08-09 RX ADMIN — Medication 0.5 MILLIGRAM(S): at 00:21

## 2023-08-09 RX ADMIN — POLYETHYLENE GLYCOL 3350 17 GRAM(S): 17 POWDER, FOR SOLUTION ORAL at 11:29

## 2023-08-09 RX ADMIN — HEPARIN SODIUM 5000 UNIT(S): 5000 INJECTION INTRAVENOUS; SUBCUTANEOUS at 11:28

## 2023-08-09 RX ADMIN — Medication 2 TABLET(S): at 03:43

## 2023-08-09 RX ADMIN — Medication 975 MILLIGRAM(S): at 03:40

## 2023-08-09 RX ADMIN — Medication 975 MILLIGRAM(S): at 04:40

## 2023-08-09 RX ADMIN — SIMETHICONE 80 MILLIGRAM(S): 80 TABLET, CHEWABLE ORAL at 06:24

## 2023-08-09 NOTE — PROGRESS NOTE ADULT - TIME BILLING
Kidney donor post op day 2  Clinically stable  Suggestions:  Discharge planning with f/u at transplant clinic  I was present during and reviewed clinical and lab data as well as assessment and plan as documented by the housestaff as noted. Please contact if any additional questions with any change in clinical condition or on availability of any additional information or reports.
Kidney donor post op day 2  Clinical, lab data reviewed  Suggestions- will follow Hb/Hct, electrolytes  Discussed advancing diet, incentive spirometry and ambulation  I was present during and reviewed clinical and lab data as well as assessment and plan as documented by the housestaff as noted. Please contact if any additional questions with any change in clinical condition or on availability of any additional information or reports.

## 2023-08-09 NOTE — PROGRESS NOTE ADULT - ASSESSMENT
64 y/o female PMHx HTN (controlled with monotherapy), HLD and IBS. Underwent laparoscopic right nephrectomy for living donation to her  on 8/7/23    [] s/p R lap donor nephrectomy (POD#2)  - d/c home today  - labs appropriate  - bowel regimen  - tylenol PO prn  - f/u in clinic in one week  - restart home meds

## 2023-08-09 NOTE — PROGRESS NOTE ADULT - ATTENDING COMMENTS
POD#1 s/p laparoscopic right donor nephrectomy  no signs of bleeding  Cr stable  pain controlled with PCA, but patient has nausea  d/c PCA and transition to PO meds  liquid diet as tolerated  OOB, PT  start home med Bentyl for IBS
POD#1 s/p laparoscopic right donor nephrectomy  no signs of bleeding  Cr stable  pain controlled with PCA, but patient has nausea  d/c PCA and transition to PO meds  liquid diet as tolerated  OOB, PT  start home med Bentyl for IBS

## 2023-08-09 NOTE — PROGRESS NOTE ADULT - REASON FOR ADMISSION
Donor nephrectomy
Laparoscopic right donor nephrectomy
Laparoscopic right donor nephrectomy
kidney donor

## 2023-08-09 NOTE — PROGRESS NOTE ADULT - NS ATTEND AMEND GEN_ALL_CORE FT
POD#2 s/p laparoscopic right donor nephrectomy  no signs of bleeding  Cr stable  pain controlled with oral meds. Nausea resolved. Celine PO  OOB, PT  d/c home today with f/u in office next week

## 2023-08-09 NOTE — DISCHARGE NOTE NURSING/CASE MANAGEMENT/SOCIAL WORK - PATIENT PORTAL LINK FT
You can access the FollowMyHealth Patient Portal offered by [facility] by registering at the following website: [email or web address]. By joining [facility]’s FollowMyHealth portal, you will also be able to view your health information using other applications (apps) compatible with our system.

## 2023-08-09 NOTE — PROGRESS NOTE ADULT - ASSESSMENT
63 y.o F w/ PMHx of HTN, HLD and IBS here for directed kidney donation to her . Now s/p laparoscopic kidney donation.

## 2023-08-09 NOTE — PROGRESS NOTE ADULT - PROBLEM SELECTOR PLAN 1
Pt is s/p laparoscopic R living donor nephrectomy. Patient is nonoliguric, passed TOV yesterday. Labs pending from today. Vitals reviewed.     Patient is tolerating a diet, urinating well, pain is controlled. Would like to be discharged today. Avoid nephrotoxic agents. Dose meds per eGFR.      If you have any questions, please feel free to contact me  Robert Guzman  Nephrology Fellow  [phone]; Prefer [employer] TEAMS  (After 5pm or on weekends please page the on-call fellow).
Pt is s/p laparoscopic R living donor nephrectomy. Patient is nonoliguric, though overnight there was concern for low UOP so given a fluid bolus. Labs pending from today. Vitals reviewed.     Will plan to dc the PCA pump today, remove johnson and do TOV. Advance diet as tolerated.   Avoid nephrotoxic agents. Dose meds per eGFR.      If you have any questions, please feel free to contact me  Robert Guzman  Nephrology Fellow  [phone]; Prefer [employer] TEAMS  (After 5pm or on weekends please page the on-call fellow).

## 2023-08-09 NOTE — PROGRESS NOTE ADULT - NS_MD_PANP_GEN_ALL_CORE
Physical Therapy Daily Progress Note    Patient: Marta Giraldo   : 1966  Diagnosis/ICD-10 Code:     Diagnosis Plan   1. Gait instability     2. Below knee amputation (CMS/HCC)     3. At high risk for falls     4. 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 Diabetic polyneuropathy associated with type 2 diabetes mellitus (CMS/HCC)       Referring practitioner: Gato Crane MD  Date of Initial Visit: Type: THERAPY  Noted: 10/30/2020  Today's Date: 2020  Patient seen for 3 sessions      PT Recheck Due: 2020  PT MD Visit: 2020       Marta Giraldo         Subjective Evaluation    History of Present Illness    Subjective comment: Pt states her left leg is hurting her a little but not too bad today.  Pain  Current pain ratin             Objective   See Exercise, Manual, and Modality Logs for complete treatment.       Assessment & Plan     Assessment  Assessment details: Pt with fair - poor recall of simple HEP established last visit.  Sit to stand transfers improved this visit with decreased cues for safe hand placement and a slight improvement with controlled descent.  Pt reports pain in B knees with sit to stands (longstanding) .  Pt reports she utilizes power wc for majority of mobility in her home an donly uses walker on occasion at home.  Improved gait distance this date and short distance gait on carpet with good tolerance as well.      Goals  Plan Goals: Short Term Goals:  1) Patient I with HEP and have additoins/changes by next recertification. (ongoing)     2) Patient able to ambulate 150 feet with RW with good heel to toe gait and = step lengths, step through gait pattern and no LOB.    3) Patient able to perform sit to/from stand with 1 UE A = WB B LE x5 reps, with good eccentric control in 30 seconds.    4) TUG in <= 30 seconds with RW assistive device, good eccentric control and safely with no LOB.     5) Rhomberg EO >= 30 seconds.    6) Rhomberg EC >= 10  seconds.    7) B hips >= 4/5    8) Patient to show understanding of sock use for proper prosthetic fit.    Long Term Goals:  1) B LE 5/5.    2) Patient able to ascend/descend 3 steps with B hand rail assist, no increase in pain x5 reps.    3) Patient able to ambulate with/without least restrictive assistive device non-antalgicaly >= 300 feet.    4) TUG in <= 25 seconds with/without least restrictive assistive device, good eccentric control and safely with no LOB.     5) patient able to perform 20 sit to/from stand with 1 UE A.    6) Rhomberg EO >= 60 seconds.    7) Rhomberg EC >= 30 seconds.    8) Patient able to perform double limb reaching 12 inches from midline body with arm outstretched with no LOB.    9) Patient to be I with final HEP.    Plan  Plan details: Next visit add standing tolerance activity for strength and endurance.  Progress HEP as tolerated.       Progress per Plan of Care and Progress strengthening /stabilization /functional activity            Timed:  Manual Therapy:         mins  92611;  Therapeutic Exercise:    16    mins  32432;   Aquatic Therex :        mins  11502    Neuromuscular Cindi:        mins  15721;    Therapeutic Activity:     14     mins  57621;     Gait Trainin     mins  87734;     Ultrasound:          mins  04851;    Electrical Stimulation:         mins  75876 ( );    Untimed:  Electrical Stimulation:         mins  12151 ( );  Mechanical Traction:         mins  40777;   Orthotic fit/train:         mins  45553    Timed Treatment:   48   mins   Total Treatment:     48   mins  Simona Marie PTA  Physical Therapist Assistant   Attending and PA/NP shared services statement (NON-critical care):

## 2023-08-09 NOTE — PROGRESS NOTE ADULT - SUBJECTIVE AND OBJECTIVE BOX
[facility] DIVISION OF KIDNEY DISEASES AND HYPERTENSION -- FOLLOW UP NOTE  --------------------------------------------------------------------------------  Chief Complaint: s/p directed kidney donation    HPI:  63 y.o F w/ PMHx of HTN, HLD and IBS here for directed kidney donation to her . Patient is now s/p laparoscopic kidney donation.     Interval history: Patient seen this morning, complains of gas pain. Feels ready to go home. Denies chills, chest pain, shortness of breath, cough, wheezing, and sore throat.    PAST HISTORY  --------------------------------------------------------------------------------  No significant changes to PMH, PSH, FHx, SHx, unless otherwise noted    ALLERGIES & MEDICATIONS  --------------------------------------------------------------------------------  Allergies    Shellfish (Swelling; Rash)  No Known Drug Allergies    Intolerances      Standing Inpatient Medications  atorvastatin 10 milliGRAM(s) Oral at bedtime  chlorhexidine 2% Cloths 1 Application(s) Topical once  heparin   Injectable 5000 Unit(s) SubCutaneous every 12 hours  lactated ringers. 1000 milliLiter(s) IV Continuous <Continuous>  polyethylene glycol 3350 17 Gram(s) Oral daily  senna 2 Tablet(s) Oral at bedtime  simethicone 80 milliGRAM(s) Chew every 4 hours    PRN Inpatient Medications  acetaminophen     Tablet .. 975 milliGRAM(s) Oral every 6 hours PRN  calcium carbonate    500 mG (Tums) Chewable 2 Tablet(s) Chew three times a day PRN  dicyclomine 20 milliGRAM(s) Oral four times a day before meals PRN  ondansetron Injectable 4 milliGRAM(s) IV Push every 4 hours PRN  traMADol 25 milliGRAM(s) Oral every 6 hours PRN  traMADol 50 milliGRAM(s) Oral every 6 hours PRN      REVIEW OF SYSTEMS  --------------------------------------------------------------------------------  per above     VITALS/PHYSICAL EXAM  --------------------------------------------------------------------------------  T(C): 36.6 (08-09-23 @ 05:19), Max: 37.1 (08-08-23 @ 17:05)  HR: 72 (08-09-23 @ 05:19) (58 - 78)  BP: 163/89 (08-09-23 @ 05:19) (128/85 - 168/95)  RR: 18 (08-09-23 @ 05:19) (18 - 18)  SpO2: 93% (08-09-23 @ 05:19) (92% - 98%)  Wt(kg): --        08-08-23 @ 07:01  -  08-09-23 @ 07:00  --------------------------------------------------------  IN: 3000 mL / OUT: 1950 mL / NET: 1050 mL      Physical Exam:  	Gen: NAD, well-appearing  	HEENT: atraumatic  	Pulm: CTA B/L  	CV: RRR, S1S2; no rub  	Back: No spinal or CVA tenderness; no sacral edema  	Abd: +BS, soft, +mildly tender; laparoscopic incisions seen. not nondistended  	: No suprapubic tenderness  	UE: Warm, FROM, no clubbing, intact strength; no edema; no asterixis  	LE: Warm, FROM, no clubbing, intact strength; no edema  	Neuro: No focal deficits, intact gait  	Psych: Normal affect and mood  	Skin: Warm, without rashes  	Vascular access:      LABS/STUDIES  --------------------------------------------------------------------------------              12.2   10.74 >-----------<  186      [08-08-23 @ 14:26]              36.4     139  |  102  |  14  ----------------------------<  111      [08-08-23 @ 07:44]  3.6   |  24  |  1.12        Ca     7.7     [08-08-23 @ 07:44]      Mg     1.9     [08-08-23 @ 07:44]      Phos  3.3     [08-08-23 @ 07:44]            Creatinine Trend:  SCr 1.12 [08-08 @ 07:44]  SCr 0.98 [08-07 @ 18:14]  SCr 0.76 [08-07 @ 12:57]              Urinalysis - [08-08-23 @ 07:44]      Color  / Appearance  / SG  / pH       Gluc 111 / Ketone   / Bili  / Urobili        Blood  / Protein  / Leuk Est  / Nitrite       RBC  / WBC  / Hyaline  / Gran  / Sq Epi  / Non Sq Epi  / Bacteria             
Transplant Surgery - Post-Op Check  --------------------------------------------------------------  R donor nephrectomy  Date: 23   POD#0    Interval Events: POD#0 s/p laparoscopic right donor nephrectomy. Patient was seen and examined in PACU, resting comfortably in stretcher, pain well controlled with dilaudid PCA. Denies chest pain, shortness of breath, palpitations, cough, fever/chills, nausea, vomiting. Summers in place with clear yellow urine.     Potential Discharge date: TBD    Education:  Medications    Plan of care:  See Below    MEDICATIONS  (STANDING):  atorvastatin 10 milliGRAM(s) Oral at bedtime  chlorhexidine 2% Cloths 1 Application(s) Topical once  heparin   Injectable 5000 Unit(s) SubCutaneous every 12 hours  HYDROmorphone PCA (1 mG/mL) 30 milliLiter(s) PCA Continuous PCA Continuous  lactated ringers. 1000 milliLiter(s) (125 mL/Hr) IV Continuous <Continuous>  polyethylene glycol 3350 17 Gram(s) Oral daily  senna 2 Tablet(s) Oral at bedtime    MEDICATIONS  (PRN):  HYDROmorphone  Injectable 0.5 milliGRAM(s) IV Push every 10 minutes PRN Severe Pain (7 - 10)  HYDROmorphone PCA (1 mG/mL) Rescue Clinician Bolus 0.5 milliGRAM(s) IV Push every 15 minutes PRN for Pain Scale GREATER THAN 6  nalbuphine Injectable 2.5 milliGRAM(s) IV Push every 6 hours PRN Pruritus  naloxone Injectable 0.1 milliGRAM(s) IV Push every 3 minutes PRN For ANY of the following changes in patient status:  A. RR LESS THAN 10 breaths per minute, B. Oxygen saturation LESS THAN 90%, C. Sedation score of 6  ondansetron Injectable 4 milliGRAM(s) IV Push every 6 hours PRN Nausea  oxyCODONE    IR 5 milliGRAM(s) Oral once PRN Moderate Pain (4 - 6)  traMADol 50 milliGRAM(s) Oral every 6 hours PRN Severe Pain (7 - 10)  traMADol 25 milliGRAM(s) Oral every 6 hours PRN Moderate Pain (4 - 6)      PAST MEDICAL & SURGICAL HISTORY:  Hypertension      Hyperlipidemia      2019 novel coronavirus disease (COVID-19)      History of IBS      H/O colonoscopy      S/P tonsillectomy       (normal spontaneous vaginal delivery)          Vital Signs Last 24 Hrs  T(C): 36.2 (07 Aug 2023 16:00), Max: 36.8 (07 Aug 2023 06:45)  T(F): 97.2 (07 Aug 2023 16:00), Max: 98.2 (07 Aug 2023 06:45)  HR: 70 (07 Aug 2023 16:00) (61 - 79)  BP: 140/70 (07 Aug 2023 16:00) (120/68 - 171/93)  BP(mean): 99 (07 Aug 2023 16:00) (89 - 101)  RR: 16 (07 Aug 2023 16:00) (16 - 18)  SpO2: 99% (07 Aug 2023 16:00) (95% - 100%)    Parameters below as of 07 Aug 2023 16:00  Patient On (Oxygen Delivery Method): room air        I&O's Summary    07 Aug 2023 07:01  -  07 Aug 2023 16:21  --------------------------------------------------------  IN: 625 mL / OUT: 1525 mL / NET: -900 mL                              13.7   14.23 )-----------( 179      ( 07 Aug 2023 12:57 )             41.1     0807    140  |  100  |  10  ----------------------------<  198<H>  3.7   |  22  |  0.76    Ca    8.2<L>      07 Aug 2023 12:57  Phos  3.6     08  Mg     2.2     08      Review of systems  Gen: No weight changes, fatigue, fevers/chills, weakness  Skin: No rashes  Head/Eyes/Ears/Mouth: No headache; Normal hearing; Normal vision w/o blurriness; No sinus pain/discomfort, sore throat  Respiratory: No dyspnea, cough, wheezing, hemoptysis  CV: No chest pain, PND, orthopnea  GI: Mild abdominal pain at surgical incision site; denies diarrhea, constipation, nausea, vomiting, melena, hematochezia  : No increased frequency, dysuria, hematuria, nocturia  MSK: No joint pain/swelling; no back pain; no edema  Neuro: No dizziness/lightheadedness, weakness, seizures, numbness, tingling  Heme: No easy bruising or bleeding  Endo: No heat/cold intolerance  Psych: No significant nervousness, anxiety, stress, depression  All other systems were reviewed and are negative, except as noted.      PHYSICAL EXAM:  Constitutional: Well developed / well nourished  Eyes: Anicteric, PERRLA  ENMT: nc/at  Neck: supple  Respiratory: CTA B/L  Cardiovascular: RRR  Gastrointestinal: Soft, non distended, mild tenderness at the incision site; periumbilical incision and port site incisions c/d/i with surgical glue  Genitourinary: Urinary catheter in place  Extremities: SCD's in place and working bilaterally  Vascular: Palpable dp pulses bilaterally  Neurological: A&O x3  Skin: no rashes, ulcerations or lesions;  Musculoskeletal: Moving all extremities  Psychiatric: Responsive    
Day 1 of Anesthesia Pain Management Service    SUBJECTIVE: Doing ok    Pain Scale Score:	[X] Refer to charted pain scores    THERAPY:    [ ] IV PCA Morphine		        [ ] 5 mg/mL	[ ] 1 mg/mL  [X] IV PCA Hydromorphone	[ ] 5 mg/mL	[X] 1 mg/mL  [ ] IV PCA Fentanyl		        [ ] 50 micrograms/mL    Demand dose: 0.2 mg     Lockout: 6 minutes   Continuous Rate: 0 mg/hr  4 Hour Limit: 4 mg    MEDICATIONS  (STANDING):  atorvastatin 10 milliGRAM(s) Oral at bedtime  chlorhexidine 2% Cloths 1 Application(s) Topical once  heparin   Injectable 5000 Unit(s) SubCutaneous every 12 hours  HYDROmorphone PCA (1 mG/mL) 30 milliLiter(s) PCA Continuous PCA Continuous  insulin lispro (ADMELOG) corrective regimen sliding scale   SubCutaneous Before meals and at bedtime  lactated ringers Bolus 1000 milliLiter(s) IV Bolus once  lactated ringers. 1000 milliLiter(s) (125 mL/Hr) IV Continuous <Continuous>  metoclopramide Injectable 5 milliGRAM(s) IV Push once  polyethylene glycol 3350 17 Gram(s) Oral daily  senna 2 Tablet(s) Oral at bedtime    MEDICATIONS  (PRN):  HYDROmorphone PCA (1 mG/mL) Rescue Clinician Bolus 0.5 milliGRAM(s) IV Push every 15 minutes PRN for Pain Scale GREATER THAN 6  nalbuphine Injectable 2.5 milliGRAM(s) IV Push every 6 hours PRN Pruritus  naloxone Injectable 0.1 milliGRAM(s) IV Push every 3 minutes PRN For ANY of the following changes in patient status:  A. RR LESS THAN 10 breaths per minute, B. Oxygen saturation LESS THAN 90%, C. Sedation score of 6  ondansetron Injectable 4 milliGRAM(s) IV Push every 4 hours PRN Nausea and/or Vomiting  simethicone 80 milliGRAM(s) Chew every 4 hours PRN Gas  traMADol 25 milliGRAM(s) Oral every 6 hours PRN Moderate Pain (4 - 6)  traMADol 50 milliGRAM(s) Oral every 6 hours PRN Severe Pain (7 - 10)      OBJECTIVE:    Sedation Score:	[ X] Alert	 [ ] Drowsy 	[ ] Arousable	[ ] Asleep	[ ] Unresponsive    Side Effects:	[ ] None	[X ] Nausea: antiemetics prn	[ ] Vomiting	[ ] Pruritus  		[ ] Other:    Vital Signs Last 24 Hrs  T(C): 37.1 (08 Aug 2023 05:42), Max: 37.1 (08 Aug 2023 05:42)  T(F): 98.7 (08 Aug 2023 05:42), Max: 98.7 (08 Aug 2023 05:42)  HR: 70 (08 Aug 2023 05:42) (61 - 79)  BP: 124/78 (08 Aug 2023 05:42) (120/68 - 152/72)  BP(mean): 91 (07 Aug 2023 20:00) (89 - 101)  RR: 18 (08 Aug 2023 05:42) (16 - 18)  SpO2: 92% (08 Aug 2023 05:42) (92% - 100%)    Parameters below as of 08 Aug 2023 05:42  Patient On (Oxygen Delivery Method): room air        ASSESSMENT/ PLAN    Therapy to  be:               [  ] Continued   [X ] Discontinued   [ X] Changed to PRN Analgesics    Documentation and Verification of current medications:   [X] Done	[ ] Not done, not eligible    Comments: Endorsing adequate pain control, some nausea with PCA. Antiemetics administered prn.  D\C PCA and transition to prn analgesics 
Pain Management Attending Addendum    SUBJECTIVE: Patient doing well with IV PCA    Therapy:    [X] IV PCA         [ ] PRN Analgesics    OBJECTIVE:   [X] Pain appropriately controlled    [ ] Other:    Side Effects:  [] None	             [X ] Nausea              [ ] Pruritis                	[ ] Other:    ASSESSMENT/PLAN:  Therapy changed to PRN analgesics    Comments: PCA to be discontinued by surgical team
VERONICA MCKENNA is a 63y Female admitted for a laparoscopic donor nephrectomy on 8/7/23. PMH is significant for HLD, HTN, IBS    Shellfish (Swelling; Rash)  No Known Drug Allergies      Current medications:  atorvastatin 10 milliGRAM(s) Oral at bedtime  chlorhexidine 2% Cloths 1 Application(s) Topical once  heparin   Injectable 5000 Unit(s) SubCutaneous every 12 hours  HYDROmorphone  Injectable 0.5 milliGRAM(s) IV Push every 10 minutes PRN  HYDROmorphone PCA (1 mG/mL) 30 milliLiter(s) PCA Continuous PCA Continuous  HYDROmorphone PCA (1 mG/mL) Rescue Clinician Bolus 0.5 milliGRAM(s) IV Push every 15 minutes PRN  lactated ringers. 1000 milliLiter(s) IV Continuous <Continuous>  nalbuphine Injectable 2.5 milliGRAM(s) IV Push every 6 hours PRN  naloxone Injectable 0.1 milliGRAM(s) IV Push every 3 minutes PRN  ondansetron Injectable 4 milliGRAM(s) IV Push every 6 hours PRN  oxyCODONE    IR 5 milliGRAM(s) Oral once PRN  polyethylene glycol 3350 17 Gram(s) Oral daily  senna 2 Tablet(s) Oral at bedtime  traMADol 50 milliGRAM(s) Oral every 6 hours PRN  traMADol 25 milliGRAM(s) Oral every 6 hours PRN      Home Medications:  Lipitor 10 mg oral tablet: 1 orally once a day (at bedtime) (07 Aug 2023 06:44)  olmesartan 40 mg oral tablet: 1 orally once a day (07 Aug 2023 06:44)      Outpatient medication reconciliation reviewed and will be re-started appropriately.    Continue pain medication and bowel regimen, as discussed with multidisciplinary team. The medication regimen will be reviewed prior to discharge.     
Surgery Progress Note    Subjective:     Patient seen and examined at bedside. POD 1. Endorsed some nausea and vomiting o/n, treated with Zofran and Pepcid, likely from PCA. 500cc bolus overnight. UOP adequate, post op Hgb 13.5 x 2 on repeat labs. Has not had any PO intake yet.    OBJECTIVE:     T(C): 37.1 (08-08-23 @ 05:42), Max: 37.1 (08-08-23 @ 05:42)  HR: 70 (08-08-23 @ 05:42) (61 - 79)  BP: 124/78 (08-08-23 @ 05:42) (120/68 - 152/72)  RR: 18 (08-08-23 @ 05:42) (16 - 18)  SpO2: 92% (08-08-23 @ 05:42) (92% - 100%)  Wt(kg): --    I&O's Detail    07 Aug 2023 07:01  -  08 Aug 2023 07:00  --------------------------------------------------------  IN:    Lactated Ringers: 2125 mL    Lactated Ringers Bolus: 1500 mL    Oral Fluid: 50 mL  Total IN: 3675 mL    OUT:    Indwelling Catheter - Urethral (mL): 575 mL    Voided (mL): 1900 mL  Total OUT: 2475 mL    Total NET: 1200 mL      08 Aug 2023 07:01  -  08 Aug 2023 09:37  --------------------------------------------------------  IN:  Total IN: 0 mL    OUT:    Indwelling Catheter - Urethral (mL): 100 mL  Total OUT: 100 mL    Total NET: -100 mL          PHYSICAL EXAM:    GENERAL: NAD, lying in bed comfortably  LUNGS: Comfortable on 2L NC  : Summers in  ABDOMEN: Mildly tender and distended. 5mm port sites x3, c/d/i with dermabond. Midline periumbilical incision c/d/i, no bleeding or dehiscence    MEDICATIONS  (STANDING):  atorvastatin 10 milliGRAM(s) Oral at bedtime  chlorhexidine 2% Cloths 1 Application(s) Topical once  heparin   Injectable 5000 Unit(s) SubCutaneous every 12 hours  insulin lispro (ADMELOG) corrective regimen sliding scale   SubCutaneous Before meals and at bedtime  lactated ringers Bolus 1000 milliLiter(s) IV Bolus once  lactated ringers. 1000 milliLiter(s) (125 mL/Hr) IV Continuous <Continuous>  metoclopramide Injectable 5 milliGRAM(s) IV Push once  polyethylene glycol 3350 17 Gram(s) Oral daily  senna 2 Tablet(s) Oral at bedtime    MEDICATIONS  (PRN):  ondansetron Injectable 4 milliGRAM(s) IV Push every 4 hours PRN Nausea and/or Vomiting  simethicone 80 milliGRAM(s) Chew every 4 hours PRN Gas  traMADol 25 milliGRAM(s) Oral every 6 hours PRN Moderate Pain (4 - 6)  traMADol 50 milliGRAM(s) Oral every 6 hours PRN Severe Pain (7 - 10)      LABS:                          11.6   12.54 )-----------( 195      ( 08 Aug 2023 07:47 )             34.5     08-08    139  |  102  |  14  ----------------------------<  111<H>  3.6   |  24  |  1.12    Ca    7.7<L>      08 Aug 2023 07:44  Phos  3.3     08-08  Mg     1.9     08-08                
Transplant Surgery - Multidisciplinary Progress Note  --------------------------------------------------------------  R donor nephrectomy  8/7/23   POD#2    Pt seen and examined with the multidisciplinary transplant team: Surgery: Dr. Nielsen, GREGORY Love.  Nephrologist: Dr. Colin, Dr. Guzman, unit RN.  Rest of disciplines not in attendance will be notified of plan.    Daniel Amaya is a 64 y/o F with past medical hisotry significant for HLD, HTN (diagnosed at age 59), IBS with past surgical history significant for tonsillectomy presents to [facility] on 8/7/23 s/p laparoscopic RIGHT donor nephrectomy with Dr De Los Santos.     Interval Events:   Afebrile, VSS  now with improved PO intake  good UO after johnson removal  pain well controlled, ambulatory, having bowel function    Potential Discharge date: Today    Education:  Medications    Plan of care:  See Below      Review of systems  Gen: No weight changes, fatigue, fevers/chills, weakness  Skin: No rashes  Head/Eyes/Ears/Mouth: No headache; Normal hearing; Normal vision w/o blurriness; No sinus pain/discomfort, sore throat  Respiratory: No dyspnea, cough, wheezing, hemoptysis  CV: No chest pain, PND, orthopnea  GI: Mild abdominal pain at surgical incision site; denies diarrhea, constipation, nausea, vomiting, melena, hematochezia  : No increased frequency, dysuria, hematuria, nocturia  MSK: No joint pain/swelling; no back pain; no edema  Neuro: No dizziness/lightheadedness, weakness, seizures, numbness, tingling  Heme: No easy bruising or bleeding  Endo: No heat/cold intolerance  Psych: No significant nervousness, anxiety, stress, depression  All other systems were reviewed and are negative, except as noted.      PHYSICAL EXAM:  Constitutional: Well developed / well nourished  Eyes: Anicteric, PERRLA  ENMT: nc/at  Neck: supple  Respiratory: CTA B/L  Cardiovascular: RRR  Gastrointestinal: Soft, non distended, mild tenderness at the incision site; periumbilical incision and port site incisions c/d/i with dermabond  Genitourinary: voiding  Extremities: SCD's in place and working bilaterally  Vascular: Palpable dp pulses bilaterally  Neurological: A&O x3  Skin: no rashes, ulcerations or lesions;  Musculoskeletal: Moving all extremities  Psychiatric: Responsive    
VERONICA MCKENNA was admitted for a laparoscopic donor nephrectomy on 8/7/2023.     The medication regimen was reviewed prior to discharge.     Education Summary: Discharge medications reviewed with the patient. Outpatient medication schedule was discussed in detail including: medication name, indication, dose, administration times, treatment duration, side effects, and special instructions. Patient questions and concerns were answered and addressed. Patient demonstrated understanding and was able to repeat and verbalize key points.    Discharge medications:  acetaminophen     Tablet .. 975 milliGRAM(s) Oral every 6 hours PRN  atorvastatin 10 milliGRAM(s) Oral at bedtime  calcium carbonate    500 mG (Tums) Chewable 2 Tablet(s) Chew three times a day PRN  dicyclomine 20 milliGRAM(s) Oral four times a day before meals PRN  hydrALAZINE Injectable 10 milliGRAM(s) IV Push once  polyethylene glycol 3350 17 Gram(s) Oral daily  senna 2 Tablet(s) Oral at bedtime  simethicone 80 milliGRAM(s) Chew every 4 hours  traMADol 25 milliGRAM(s) Oral every 6 hours PRN  traMADol 50 milliGRAM(s) Oral every 6 hours PRN      Time spent discharge education: 10 minutes  
[facility] DIVISION OF KIDNEY DISEASES AND HYPERTENSION -- FOLLOW UP NOTE  --------------------------------------------------------------------------------  Chief Complaint: s/p directed kidney donation    HPI:  63 y.o F w/ PMHx of HTN, HLD and IBS here for directed kidney donation to her . Patient is now s/p kidney donation. Seen in the PACU post op. She denies acute complaints except pain with movement.     Interval history: Patient seen this morning, complains of gas pain. Overnight, UOP dropped so give a fluid bolus. Denies chills, chest pain, shortness of breath, cough, wheezing, and sore throat.    PAST HISTORY  --------------------------------------------------------------------------------  No significant changes to PMH, PSH, FHx, SHx, unless otherwise noted    ALLERGIES & MEDICATIONS  --------------------------------------------------------------------------------  Allergies    Shellfish (Swelling; Rash)  No Known Drug Allergies    Intolerances      Standing Inpatient Medications  atorvastatin 10 milliGRAM(s) Oral at bedtime  chlorhexidine 2% Cloths 1 Application(s) Topical once  heparin   Injectable 5000 Unit(s) SubCutaneous every 12 hours  HYDROmorphone PCA (1 mG/mL) 30 milliLiter(s) PCA Continuous PCA Continuous  insulin lispro (ADMELOG) corrective regimen sliding scale   SubCutaneous Before meals and at bedtime  lactated ringers Bolus 1000 milliLiter(s) IV Bolus once  lactated ringers. 1000 milliLiter(s) IV Continuous <Continuous>  polyethylene glycol 3350 17 Gram(s) Oral daily  senna 2 Tablet(s) Oral at bedtime    PRN Inpatient Medications  HYDROmorphone PCA (1 mG/mL) Rescue Clinician Bolus 0.5 milliGRAM(s) IV Push every 15 minutes PRN  nalbuphine Injectable 2.5 milliGRAM(s) IV Push every 6 hours PRN  naloxone Injectable 0.1 milliGRAM(s) IV Push every 3 minutes PRN  ondansetron Injectable 4 milliGRAM(s) IV Push every 4 hours PRN  simethicone 80 milliGRAM(s) Chew every 4 hours PRN  traMADol 25 milliGRAM(s) Oral every 6 hours PRN  traMADol 50 milliGRAM(s) Oral every 6 hours PRN      REVIEW OF SYSTEMS  --------------------------------------------------------------------------------  per above    VITALS/PHYSICAL EXAM  --------------------------------------------------------------------------------  T(C): 37.1 (08-08-23 @ 05:42), Max: 37.1 (08-08-23 @ 05:42)  HR: 70 (08-08-23 @ 05:42) (61 - 79)  BP: 124/78 (08-08-23 @ 05:42) (120/68 - 152/72)  RR: 18 (08-08-23 @ 05:42) (16 - 18)  SpO2: 92% (08-08-23 @ 05:42) (92% - 100%)  Wt(kg): --  Height (cm): 157.5 (08-07-23 @ 06:57)  Weight (kg): 78.3 (08-07-23 @ 06:57)  BMI (kg/m2): 31.6 (08-07-23 @ 06:57)  BSA (m2): 1.8 (08-07-23 @ 06:57)      08-07-23 @ 07:01  -  08-08-23 @ 07:00  --------------------------------------------------------  IN: 3250 mL / OUT: 2385 mL / NET: 865 mL      Physical Exam:  	Gen: NAD, well-appearing  	HEENT: atraumatic  	Pulm: CTA B/L  	CV: RRR, S1S2; no rub  	Back: No spinal or CVA tenderness; no sacral edema  	Abd: +BS, soft, +tender; laparoscopic incisions seen. not nondistended  	: No suprapubic tenderness +johnson draining dilute urine  	UE: Warm, FROM, no clubbing, intact strength; no edema; no asterixis  	LE: Warm, FROM, no clubbing, intact strength; no edema  	Neuro: No focal deficits, intact gait  	Psych: Normal affect and mood  	Skin: Warm, without rashes  	Vascular access:        LABS/STUDIES  --------------------------------------------------------------------------------              13.5   12.92 >-----------<  182      [08-07-23 @ 18:14]              40.5     141  |  100  |  11  ----------------------------<  206      [08-07-23 @ 18:14]  3.7   |  24  |  0.98        Ca     8.3     [08-07-23 @ 18:14]      Mg     1.9     [08-07-23 @ 18:14]      Phos  5.0     [08-07-23 @ 18:14]            Creatinine Trend:  SCr 0.98 [08-07 @ 18:14]  SCr 0.76 [08-07 @ 12:57]              Urinalysis - [08-07-23 @ 18:14]      Color  / Appearance  / SG  / pH       Gluc 206 / Ketone   / Bili  / Urobili        Blood  / Protein  / Leuk Est  / Nitrite       RBC  / WBC  / Hyaline  / Gran  / Sq Epi  / Non Sq Epi  / Bacteria

## 2023-08-09 NOTE — PROGRESS NOTE ADULT - PROVIDER SPECIALTY LIST ADULT
Anesthesia
Anesthesia
Pharmacy
Pharmacy
Transplant Nephrology
Transplant Surgery
Transplant Nephrology

## 2023-08-09 NOTE — DISCHARGE NOTE NURSING/CASE MANAGEMENT/SOCIAL WORK - NSDCPEFALRISK_GEN_ALL_CORE
For information on Fall & Injury Prevention, visit: [email or web address] OR  [email or web address] OR  https://www.cdc.gov/steadi/patient.html

## 2023-08-15 ENCOUNTER — APPOINTMENT (OUTPATIENT)
Dept: TRANSPLANT | Facility: CLINIC | Age: 63
End: 2023-08-15

## 2023-08-15 ENCOUNTER — APPOINTMENT (OUTPATIENT)
Dept: TRANSPLANT | Facility: CLINIC | Age: 63
End: 2023-08-15
Payer: COMMERCIAL

## 2023-08-15 VITALS
SYSTOLIC BLOOD PRESSURE: 146 MMHG | RESPIRATION RATE: 15 BRPM | HEART RATE: 74 BPM | DIASTOLIC BLOOD PRESSURE: 92 MMHG | BODY MASS INDEX: 32.1 KG/M2 | WEIGHT: 170 LBS | OXYGEN SATURATION: 100 % | HEIGHT: 61 IN | TEMPERATURE: 97 F

## 2023-08-15 DIAGNOSIS — Z00.5 ENCOUNTER FOR EXAMINATION OF POTENTIAL DONOR OF ORGAN AND TISSUE: ICD-10-CM

## 2023-08-15 PROCEDURE — 99024 POSTOP FOLLOW-UP VISIT: CPT

## 2023-08-17 PROBLEM — Z00.5 WILLING TO BE KIDNEY DONOR: Status: RESOLVED | Noted: 2023-05-17 | Resolved: 2023-08-17

## 2023-08-17 NOTE — ASSESSMENT
[FreeTextEntry1] : Doing well post-donation.  Encouraged fiber intake.  Will f/u for routine postop visits.  DIscussed importance of PCP f/u ongoing.

## 2023-08-17 NOTE — PHYSICAL EXAM
[Well Developed] : well developed [Well Nourished] : well nourished [Breathing Comfortably on RA] : breathing comfortably on room air [Normal Rate] : normal rate [Regular Rhythm] : regular rhythm [Soft] : soft [Non-tender] : non-tender [Alert] : alert [Oriented] : oriented [Appropriate] : appropriate [Site: ___] : Site: [unfilled] [Clean] : clean [Dry] : dry [Healing Well] : healing well

## 2023-08-17 NOTE — HISTORY OF PRESENT ILLNESS
[FreeTextEntry5] : 64yo F presents for pretxp donor clearance.  Would like to donate to her .   She follows w PCP for routine care, on HTN monotherapy.  Reports white coat HTN and anxiety as well. Had home BPs followed x1wk ~100-110/70-80.  No h/o DM, hyperglycemia. No h/o gestational DM/HTN.  Remote h/o UTI treated w abx, no hematuria/kidney stones.   Can tolerate 2 flights of stairs without dyspnea or angina.    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 lives with  Roni. Son and daughter-in-law will help post transplant FamHx: no DM, CKD in mom in setting of multiple myeloma.  [de-identified] : 8/17 - has had some constipation/gas.  thinks it might be IBS.  otherwise no incisional pain, minimal fatigue.  tolerating diet.

## 2023-08-18 LAB
ANION GAP SERPL CALC-SCNC: 11 MMOL/L
APPEARANCE: CLEAR
BACTERIA: NEGATIVE /HPF
BILIRUBIN URINE: NEGATIVE
BLOOD URINE: NEGATIVE
BUN SERPL-MCNC: 16 MG/DL
CALCIUM SERPL-MCNC: 9.8 MG/DL
CAST: 0 /LPF
CHLORIDE SERPL-SCNC: 102 MMOL/L
CO2 SERPL-SCNC: 27 MMOL/L
COLOR: YELLOW
CREAT SERPL-MCNC: 1.23 MG/DL
CREAT SPEC-SCNC: 63 MG/DL
EGFR: 49 ML/MIN/1.73M2
EPITHELIAL CELLS: 2 /HPF
GLUCOSE QUALITATIVE U: NEGATIVE MG/DL
GLUCOSE SERPL-MCNC: 101 MG/DL
KETONES URINE: NEGATIVE MG/DL
LEUKOCYTE ESTERASE URINE: NEGATIVE
MICROALBUMIN 24H UR DL<=1MG/L-MCNC: <1.2 MG/DL
MICROALBUMIN/CREAT 24H UR-RTO: NORMAL MG/G
MICROSCOPIC-UA: NORMAL
NITRITE URINE: NEGATIVE
PH URINE: 8
POTASSIUM SERPL-SCNC: 4.8 MMOL/L
PROTEIN URINE: NEGATIVE MG/DL
RED BLOOD CELLS URINE: 1 /HPF
SODIUM SERPL-SCNC: 140 MMOL/L
SPECIFIC GRAVITY URINE: 1.01
UROBILINOGEN URINE: 0.2 MG/DL
WHITE BLOOD CELLS URINE: 0 /HPF

## 2023-11-14 NOTE — PATIENT PROFILE ADULT - FUNCTIONAL ASSESSMENT - DAILY ACTIVITY 4.
What Is The Reason For Today's Visit?: Follow Up Dysplastic Nevus 4 = No assist / stand by assistance

## 2024-02-07 ENCOUNTER — APPOINTMENT (OUTPATIENT)
Dept: TRANSPLANT | Facility: CLINIC | Age: 64
End: 2024-02-07
Payer: COMMERCIAL

## 2024-02-07 ENCOUNTER — LABORATORY RESULT (OUTPATIENT)
Age: 64
End: 2024-02-07

## 2024-02-07 ENCOUNTER — APPOINTMENT (OUTPATIENT)
Dept: NEPHROLOGY | Facility: CLINIC | Age: 64
End: 2024-02-07
Payer: COMMERCIAL

## 2024-02-07 VITALS
BODY MASS INDEX: 32.1 KG/M2 | HEIGHT: 61 IN | DIASTOLIC BLOOD PRESSURE: 78 MMHG | SYSTOLIC BLOOD PRESSURE: 112 MMHG | HEART RATE: 79 BPM | WEIGHT: 170 LBS

## 2024-02-07 VITALS
BODY MASS INDEX: 32.12 KG/M2 | HEART RATE: 79 BPM | SYSTOLIC BLOOD PRESSURE: 112 MMHG | TEMPERATURE: 97 F | WEIGHT: 170 LBS | OXYGEN SATURATION: 100 % | RESPIRATION RATE: 14 BRPM | DIASTOLIC BLOOD PRESSURE: 78 MMHG

## 2024-02-07 DIAGNOSIS — I10 ESSENTIAL (PRIMARY) HYPERTENSION: ICD-10-CM

## 2024-02-07 DIAGNOSIS — Z52.4 KIDNEY DONOR: ICD-10-CM

## 2024-02-07 DIAGNOSIS — Z90.5 ACQUIRED ABSENCE OF KIDNEY: ICD-10-CM

## 2024-02-07 DIAGNOSIS — E78.49 OTHER HYPERLIPIDEMIA: ICD-10-CM

## 2024-02-07 DIAGNOSIS — E78.5 HYPERLIPIDEMIA, UNSPECIFIED: ICD-10-CM

## 2024-02-07 PROCEDURE — 99215 OFFICE O/P EST HI 40 MIN: CPT

## 2024-02-07 PROCEDURE — 99211 OFF/OP EST MAY X REQ PHY/QHP: CPT

## 2024-02-07 RX ORDER — MULTIVIT-MIN/FOLIC/VIT K/LYCOP 400-300MCG
50 MCG TABLET ORAL
Refills: 0 | Status: ACTIVE | COMMUNITY
Start: 2024-02-07

## 2024-02-07 RX ORDER — ATORVASTATIN CALCIUM 10 MG/1
10 TABLET, FILM COATED ORAL
Qty: 90 | Refills: 3 | Status: ACTIVE | COMMUNITY
Start: 2024-02-07

## 2024-02-07 NOTE — PHYSICAL EXAM
[Soft] : soft [Non-tender] : non-tender [Normal] : normal [TextBox_25] : Umbilical incisions healed well. No incision hernia

## 2024-02-07 NOTE — ASSESSMENT
[FreeTextEntry1] : Kidney donor post donation follow up HTN: controlled off medications HLD: On statin Discussed Renal Preservation strategies again including achieving optimal weight through calory restriction and regular exercise, daily exercise, sleep hygiene, maintaining optimized levels of glucose, blood pressure, lipids, avoidance of NSAIDs, Low Na and Low animal protein diet and medication and medical follow up adherence. F/u annually/prn

## 2024-02-07 NOTE — HISTORY OF PRESENT ILLNESS
[de-identified] : S/P lap right donor nephrectomy. Post op 6 month TEIDI follow up. Doing well. Works full time. Denied dysuria, nocturia, hematuria. Denied abdominal pain. Home BP monitoring and BPs are within normal limits. Only taking Atorvastatin for hyperlipidemia.

## 2024-02-07 NOTE — HISTORY OF PRESENT ILLNESS
[FreeTextEntry1] : Here for post kidney donation follow up. No acute symptoms. Independent for ADL. Works full time Reviewed meds.

## 2024-02-07 NOTE — ASSESSMENT
[FreeTextEntry1] : S/P lap right donor nephrectomy. Doing well. Incisions are nicely healed. Works full time. Has returned to normal life. Reported BP normalized after donation. Home BP monitoring daily. Readings are WNL.  Will do lab. Will add TSH/Free T3 T4 per request.  RTC 6 months

## 2024-02-07 NOTE — REASON FOR VISIT
[Follow-Up] : follow-up visit [FreeTextEntry3] : Laparoscopic right donor nephrectomy [FreeTextEntry5] : 08/07/23

## 2024-02-07 NOTE — PHYSICAL EXAM
[General Appearance - Alert] : alert [General Appearance - In No Acute Distress] : in no acute distress [Sclera] : the sclera and conjunctiva were normal [PERRL With Normal Accommodation] : pupils were equal in size, round, and reactive to light [Extraocular Movements] : extraocular movements were intact [Outer Ear] : the ears and nose were normal in appearance [Oropharynx] : the oropharynx was normal [Jugular Venous Distention Increased] : there was no jugular-venous distention [Auscultation Breath Sounds / Voice Sounds] : lungs were clear to auscultation bilaterally [Heart Sounds Gallop] : no gallops [Heart Sounds Pericardial Friction Rub] : no pericardial rub [Full Pulse] : the pedal pulses are present [Edema] : there was no peripheral edema [Abdomen Soft] : soft [Abdomen Tenderness] : non-tender [FreeTextEntry1] : healed surgical scar [Cervical Lymph Nodes Enlarged Posterior Bilaterally] : posterior cervical [Cervical Lymph Nodes Enlarged Anterior Bilaterally] : anterior cervical [Supraclavicular Lymph Nodes Enlarged Bilaterally] : supraclavicular [Involuntary Movements] : no involuntary movements were seen [] : no rash [No Focal Deficits] : no focal deficits [Oriented To Time, Place, And Person] : oriented to person, place, and time [Impaired Insight] : insight and judgment were intact [Affect] : the affect was normal

## 2024-02-08 LAB
25(OH)D3 SERPL-MCNC: 30.8 NG/ML
ALBUMIN SERPL ELPH-MCNC: 4.7 G/DL
ALP BLD-CCNC: 61 U/L
ALT SERPL-CCNC: 10 U/L
ANION GAP SERPL CALC-SCNC: 9 MMOL/L
APPEARANCE: CLEAR
AST SERPL-CCNC: 10 U/L
BACTERIA: NEGATIVE /HPF
BILIRUB SERPL-MCNC: 0.5 MG/DL
BILIRUBIN URINE: NEGATIVE
BLOOD URINE: NEGATIVE
BUN SERPL-MCNC: 15 MG/DL
CALCIUM SERPL-MCNC: 9.4 MG/DL
CAST: 0 /LPF
CHLORIDE SERPL-SCNC: 106 MMOL/L
CHOLEST SERPL-MCNC: 191 MG/DL
CO2 SERPL-SCNC: 27 MMOL/L
COLOR: YELLOW
CREAT SERPL-MCNC: 1.02 MG/DL
CREAT SPEC-SCNC: 130 MG/DL
CREAT SPEC-SCNC: 130 MG/DL
CREAT/PROT UR: 0.1 RATIO
EGFR: 61 ML/MIN/1.73M2
EPITHELIAL CELLS: 0 /HPF
ESTIMATED AVERAGE GLUCOSE: 97 MG/DL
GLUCOSE QUALITATIVE U: NEGATIVE MG/DL
GLUCOSE SERPL-MCNC: 110 MG/DL
HBA1C MFR BLD HPLC: 5 %
HCT VFR BLD CALC: 42 %
HDLC SERPL-MCNC: 60 MG/DL
HGB BLD-MCNC: 13.6 G/DL
KETONES URINE: NEGATIVE MG/DL
LDLC SERPL CALC-MCNC: 113 MG/DL
LEUKOCYTE ESTERASE URINE: NEGATIVE
MAGNESIUM SERPL-MCNC: 2.1 MG/DL
MCHC RBC-ENTMCNC: 28.6 PG
MCHC RBC-ENTMCNC: 32.4 GM/DL
MCV RBC AUTO: 88.2 FL
MICROALBUMIN 24H UR DL<=1MG/L-MCNC: <1.2 MG/DL
MICROALBUMIN/CREAT 24H UR-RTO: NORMAL MG/G
MICROSCOPIC-UA: NORMAL
NITRITE URINE: NEGATIVE
NONHDLC SERPL-MCNC: 131 MG/DL
PH URINE: 7.5
PHOSPHATE SERPL-MCNC: 3.3 MG/DL
PLATELET # BLD AUTO: 194 K/UL
POTASSIUM SERPL-SCNC: 4.6 MMOL/L
PROT SERPL-MCNC: 6.9 G/DL
PROT UR-MCNC: 10 MG/DL
PROTEIN URINE: NEGATIVE MG/DL
RBC # BLD: 4.76 M/UL
RBC # FLD: 13.4 %
RED BLOOD CELLS URINE: 2 /HPF
SODIUM SERPL-SCNC: 142 MMOL/L
SPECIFIC GRAVITY URINE: 1.02
TRIGL SERPL-MCNC: 103 MG/DL
URATE SERPL-MCNC: 6.7 MG/DL
UROBILINOGEN URINE: 0.2 MG/DL
WBC # FLD AUTO: 5.61 K/UL
WHITE BLOOD CELLS URINE: 0 /HPF

## 2024-02-09 LAB — TSH SERPL-ACNC: 4.48 UIU/ML

## 2024-08-30 ENCOUNTER — NON-APPOINTMENT (OUTPATIENT)
Age: 64
End: 2024-08-30

## 2024-08-30 DIAGNOSIS — Z90.5 ACQUIRED ABSENCE OF KIDNEY: ICD-10-CM

## 2024-09-20 ENCOUNTER — NON-APPOINTMENT (OUTPATIENT)
Age: 64
End: 2024-09-20

## (undated) DEVICE — DRSG DERMABOND 0.7ML

## (undated) DEVICE — TUBING SUCTION 20FT

## (undated) DEVICE — GLV 8.5 PROTEXIS (WHITE)

## (undated) DEVICE — TROCAR ETHICON ENDOPATH XCEL BLADELESS 5MM X 100MM STABILITY

## (undated) DEVICE — TUBING STRYKEFLOW II SUCTION / IRRIGATOR

## (undated) DEVICE — GOWN TRIMAX LG

## (undated) DEVICE — TUBING PLUME AWAY 4.0

## (undated) DEVICE — APPLICATOR SURGICEL LAP TROCAR POINT 2.5MM X 150MM

## (undated) DEVICE — SHEARS HARMONIC ACE PLUS 7 5MM X 36CM CURVED TIP

## (undated) DEVICE — MEDICATION LABELS W MARKER

## (undated) DEVICE — WARMING BLANKET LOWER ADULT

## (undated) DEVICE — DRAPE 3/4 SHEET W REINFORCEMENT 56X77"

## (undated) DEVICE — SUT BIOSYN 4-0 18" P-12

## (undated) DEVICE — ELCTR BOVIE TIP BLADE INSULATED 2.75" EDGE

## (undated) DEVICE — WARMING BLANKET UPPER ADULT

## (undated) DEVICE — TUBING TUR 2 PRONG

## (undated) DEVICE — DRSG TEGADERM 6"X8"

## (undated) DEVICE — ENDOCATCH II 15MM

## (undated) DEVICE — STAPLER ETHICON ECHELON FLEX 45MM X 340MM

## (undated) DEVICE — DRAPE TOWEL BLUE 17" X 24"

## (undated) DEVICE — ELCTR BOVIE PENCIL SMOKE EVACUATION

## (undated) DEVICE — SUT POLYSORB 3-0 30" V-20 UNDYED

## (undated) DEVICE — SUT SOFSILK 4-0 18" TIES

## (undated) DEVICE — GRASPER LAPA 5MMX35CM

## (undated) DEVICE — DRAPE LIGHT HANDLE COVER (BLUE)

## (undated) DEVICE — PACK BASIC GOWN

## (undated) DEVICE — VENODYNE/SCD SLEEVE CALF LARGE

## (undated) DEVICE — POSITIONER PATIENT SAFETY STRAP 3X60"

## (undated) DEVICE — SOL IRR POUR H2O 250ML

## (undated) DEVICE — GLV 8 PROTEXIS (WHITE)

## (undated) DEVICE — SPECIMEN CONTAINER 100ML

## (undated) DEVICE — FOLEY TRAY 16FR 5CC LF LUBRISIL ADVANCE TEMP CLOSED

## (undated) DEVICE — TROCAR ETHICON ENDOPATH XCEL BLADELESS 15MM X 100MM STABILITY

## (undated) DEVICE — D HELP - CLEARVIEW CLEARIFY SYSTEM

## (undated) DEVICE — SOL IRR POUR NS 0.9% 500ML

## (undated) DEVICE — PRESSURE INFUSOR BAG 1000ML

## (undated) DEVICE — TUBING INSUFFLATION LAP FILTER 10FT

## (undated) DEVICE — PACK ADVANCED LAPAROSCOPIC NS

## (undated) DEVICE — DISSECTOR ENDO PEANUT 5MM

## (undated) DEVICE — DRAPE 1/2 SHEET 40X57"

## (undated) DEVICE — POSITIONER FOAM EGG CRATE ULNAR 2PCS (PINK)

## (undated) DEVICE — SUT BOOT STANDARD (ASSORTED) 5 PAIR

## (undated) DEVICE — SUT MAXON 0 30" GS-22

## (undated) DEVICE — DRAPE SLUSH / WARMER 44 X 66"

## (undated) DEVICE — SHEARS COVIDIEN ENDO SHEAR 5MM X 31CM W UNIPOLAR CAUTERY

## (undated) DEVICE — SYR LUER LOK 50CC

## (undated) DEVICE — TAPE SILK 3"

## (undated) DEVICE — PREP CHLORAPREP HI-LITE ORANGE 26ML

## (undated) DEVICE — SUT SOFSILK 2-0 18" TIES

## (undated) DEVICE — GELPOINT ADVANCED

## (undated) DEVICE — STAPLER ECHELON FLEX POWERED ADV PLCMT TIP

## (undated) DEVICE — DRAPE ISOLATION BAG 20X20"

## (undated) DEVICE — PACK BASIN SPECIAL PROCEDURE